# Patient Record
Sex: FEMALE | Race: WHITE | NOT HISPANIC OR LATINO | Employment: FULL TIME | URBAN - METROPOLITAN AREA
[De-identification: names, ages, dates, MRNs, and addresses within clinical notes are randomized per-mention and may not be internally consistent; named-entity substitution may affect disease eponyms.]

---

## 2017-02-17 ENCOUNTER — TELEPHONE (OUTPATIENT)
Dept: FAMILY MEDICINE | Facility: CLINIC | Age: 32
End: 2017-02-17

## 2017-02-20 ENCOUNTER — TELEPHONE (OUTPATIENT)
Dept: FAMILY MEDICINE | Facility: CLINIC | Age: 32
End: 2017-02-20

## 2017-02-20 NOTE — TELEPHONE ENCOUNTER
Attempted to reach pt again no answer  Encouraged in message that she call our number day or night if having symptoms or any concerns  lbetz

## 2017-02-23 ENCOUNTER — TELEPHONE (OUTPATIENT)
Dept: FAMILY MEDICINE | Facility: CLINIC | Age: 32
End: 2017-02-23

## 2017-02-23 NOTE — TELEPHONE ENCOUNTER
ED follow up    Spoke with pt today and she is feeling much better.  Said it was about two weeks of enduring the flu but now is feeling better.    She was unaware of our 24 hr line, so we talked about that, and encouraged her to call if she had any further symptoms or concerns, or to call if she wanted to get in.    Nora

## 2017-04-20 ENCOUNTER — OFFICE VISIT (OUTPATIENT)
Dept: FAMILY MEDICINE | Facility: CLINIC | Age: 32
End: 2017-04-20

## 2017-04-20 VITALS
TEMPERATURE: 97.9 F | HEIGHT: 61 IN | HEART RATE: 107 BPM | WEIGHT: 150.8 LBS | OXYGEN SATURATION: 97 % | SYSTOLIC BLOOD PRESSURE: 130 MMHG | DIASTOLIC BLOOD PRESSURE: 85 MMHG | BODY MASS INDEX: 28.47 KG/M2

## 2017-04-20 DIAGNOSIS — R63.4 WEIGHT LOSS: Primary | ICD-10-CM

## 2017-04-20 DIAGNOSIS — F41.9 ANXIETY: ICD-10-CM

## 2017-04-20 RX ORDER — HYDROXYZINE HYDROCHLORIDE 25 MG/1
25-50 TABLET, FILM COATED ORAL
Qty: 30 TABLET | Refills: 3 | Status: SHIPPED | OUTPATIENT
Start: 2017-04-20 | End: 2018-08-13

## 2017-04-20 NOTE — MR AVS SNAPSHOT
After Visit Summary   4/20/2017    Mrs. Venice Gaffney    MRN: [id number]           Patient Information     Date Of Birth          1985        Visit Information        Provider Department      4/20/2017 9:40 AM Jesus Cruz MD [facility]        Today's Diagnoses     Weight loss    -  1    Anxiety          Care Instructions    - schedule pap smear in the near future.    Your medication list is printed, please keep this with you, it is helpful to bring this current list to any other medical appointments, the emergency room or hospital.    If you had lab testing today and your results are reassuring or normal they will be be mailed to you within 7 days.     If the lab tests need quick action we will call you with the results.   The phone number we will call with results is # [phone] (home) . If this is not the best number please call our clinic and change the number.    If you need any refills please call your pharmacy and they will contact us.    If you have any further concerns or wish to schedule another appointment you must call our office during normal business hours  [phone] (8-5:00 M-F)  If you have urgent medical questions that cannot wait  you may also call [phone] at any time of day.  If you have a medical emergency please call [phone].    Thank you for coming to [facility].          Follow-ups after your visit        Who to contact     Please call your clinic at [phone] to:    Ask questions about your health    Make or cancel appointments    Discuss your medicines    Learn about your test results    Speak to your doctor   If you have compliments or concerns about an experience at your clinic, or if you wish to file a complaint, please contact [facility] Patient Relations at [phone] or email us at [email or web address]         Additional Information About Your Visit        MyChart Information     MyChart  "gives you secure access to your electronic health record. If you see a primary care provider, you can also send messages to your care team and make appointments. If you have questions, please call your primary care clinic.  If you do not have a primary care provider, please call [phone] and they will assist you.      [employer] is an electronic gateway that provides easy, online access to your medical records. With [employer], you can request a clinic appointment, read your test results, renew a prescription or communicate with your care team.     To access your existing account, please contact your [facility] or call [phone] for assistance.        Care EveryWhere ID     This is your Care EveryWhere ID. This could be used by other organizations to access your [place] medical records  [id number]        Your Vitals Were     Pulse Temperature Height Last Period Pulse Oximetry BMI (Body Mass Index)    107 97.9  F (36.6  C) (Oral) 5' 1\" (154.9 cm) 04/10/2017 97% 28.49 kg/m2       Blood Pressure from Last 3 Encounters:   04/20/17 130/85   01/26/16 129/84   12/09/15 130/82    Weight from Last 3 Encounters:   04/20/17 150 lb 12.8 oz (68.4 kg)   01/26/16 169 lb 8 oz (76.9 kg)   12/09/15 170 lb (77.1 kg)              Today, you had the following     No orders found for display         Today's Medication Changes          These changes are accurate as of: 4/20/17 10:01 AM.  If you have any questions, ask your nurse or doctor.               Stop taking these medicines if you haven't already. Please contact your care team if you have questions.     busPIRone 15 MG tablet   Commonly known as:  BUSPAR   Stopped by:  Jesus Cruz MD                Where to get your medicines      These medications were sent to [id number] - [place], [place] - [address] [facility] [address]  [facility] [address], [place] [address]     Phone:  [phone]     " hydrOXYzine 25 MG tablet                Primary Care Provider Office Phone # Fax #    Jesus Cruz [phone]       [facility] [address] [place] [address]        Thank you!     Thank you for choosing [facility]  for your care. Our goal is always to provide you with excellent care. Hearing back from our patients is one way we can continue to improve our services. Please take a few minutes to complete the written survey that you may receive in the mail after your visit with us. Thank you!             Your Updated Medication List - Protect others around you: Learn how to safely use, store and throw away your medicines at www.disposemymeds.org.          This list is accurate as of: 4/20/17 10:01 AM.  Always use your most recent med list.                   Brand Name Dispense Instructions for use    hydrOXYzine 25 MG tablet    ATARAX    30 tablet    Take 1-2 tablets (25-50 mg) by mouth nightly as needed for anxiety or other (sleep)

## 2017-04-20 NOTE — PROGRESS NOTES
SUBJECTIVE:  This is a 31-year-old female with a past history of generalized anxiety and obesity.  I last saw her about a year and a half ago.  She changed jobs to a less stressful environment and discontinue the BuSpar.  She feels her anxiety has been under good control.  She has only very occasional days with anxiety.  In the past she used hydroxyzine on a rare occasional basis which helps with her anxiety.  She is interested in going back to using hydroxyzine for anxiety symptoms.  She predicts once a week or less.  In the past she did not have difficulty with sedation, tiredness or other side effects with this medication.   Next, as far as her obesity she has started a diet that is a lower carb and higher protein.  She has lost 20 pounds over the past 2-1/2 months.  She is happy with the weight loss.  She is not using any supplements or stimulants.  She is using an exercise ball to sit on at work.  Otherwise no changes to her exercise regimen.   REVIEW OF SYSTEMS:  No fevers or chills.  No recent illnesses.  No respiratory symptoms.  No heat or cold intolerance.  No skin, hair or nail changes, no diarrhea or constipation.  No abdominal symptoms.  No vaginal bleeding outside her menstrual periods.  Her last menstrual period was 10 days ago and was normal and no stool changes.   OBJECTIVE:   VITAL SIGNS:  As above.   GENERAL:  She is alert, no distress.   HEENT:  Head is normocephalic and atraumatic.  Eyes, sclerae are nonicteric, noninjected.  Moist mucous membranes.   NECK:  Supple without lymphadenopathy.  Thyroid size is normal.   CARDIOVASCULAR:  Regular rate and rhythm without murmur.   LUNGS:  Clear to auscultation bilaterally.   ABDOMEN:  Modestly obese, soft and nontender throughout.   EXTREMITIES:  Free of edema.   ASSESSMENT AND PLAN:   1.  Intermittent anxiety symptoms is She at this point is doing well without a controller daily medication.  She may use hydroxyzine 25 mg 1-2 tablets on days she has  breakthrough anxiety.  I anticipate 30 tablets will likely last her 3 months or more.  She will return if she has increase in her anxiety side effects or other new concerns.   2.  Obesity:  She has had successful weight loss with her new dietary changes.  She plans to continue.  I encouraged her to incorporate some regular exercise in her plan.  She will return if she develops any symptoms of hyperthyroidism or other medical conditions that we discussed today.   3.  Health care maintenance:  Her last Pap smear was in 2010, I reviewed the [facility] records which shows an old Pap smear in 2010.  She is not sure if she has had any since that time.  The Pap smear in 2010 was normal.  I offered a Pap smear today.  She declined but assures me she will make an appointment sometime in the next couple of months for cervical cancer screening.

## 2017-04-20 NOTE — PATIENT INSTRUCTIONS
- schedule pap smear in the near future.    Your medication list is printed, please keep this with you, it is helpful to bring this current list to any other medical appointments, the emergency room or hospital.    If you had lab testing today and your results are reassuring or normal they will be be mailed to you within 7 days.     If the lab tests need quick action we will call you with the results.   The phone number we will call with results is # [phone] (home) . If this is not the best number please call our clinic and change the number.    If you need any refills please call your pharmacy and they will contact us.    If you have any further concerns or wish to schedule another appointment you must call our office during normal business hours  [phone] (8-5:00 M-F)  If you have urgent medical questions that cannot wait  you may also call [phone] at any time of day.  If you have a medical emergency please call [phone].    Thank you for coming to [facility].

## 2017-12-24 ENCOUNTER — HEALTH MAINTENANCE LETTER (OUTPATIENT)
Age: 32
End: 2017-12-24

## 2018-03-26 ENCOUNTER — TELEPHONE (OUTPATIENT)
Dept: FAMILY MEDICINE | Facility: CLINIC | Age: 33
End: 2018-03-26

## 2018-03-26 NOTE — TELEPHONE ENCOUNTER
Ed follow up    Left message encouraging a call back for follow up   Non urgent   Marked as non necessary for follow up but will attempt to reach pt again    lbetz

## 2018-03-27 ENCOUNTER — TELEPHONE (OUTPATIENT)
Dept: FAMILY MEDICINE | Facility: CLINIC | Age: 33
End: 2018-03-27

## 2018-03-28 ENCOUNTER — TELEPHONE (OUTPATIENT)
Dept: FAMILY MEDICINE | Facility: CLINIC | Age: 33
End: 2018-03-28

## 2018-03-28 NOTE — TELEPHONE ENCOUNTER
ED follow up    Urgency Room [facility] for ear infection  Antibiotic given    Unable to reach pt,  Left message to call if needed     Lbetz

## 2018-05-29 ENCOUNTER — TELEPHONE (OUTPATIENT)
Dept: FAMILY MEDICINE | Facility: CLINIC | Age: 33
End: 2018-05-29

## 2018-05-29 NOTE — TELEPHONE ENCOUNTER
ED follow up- [facility] urgency room    Pt seen for sore throat and Ear ache    Unable to reach left message

## 2018-05-30 ENCOUNTER — TELEPHONE (OUTPATIENT)
Dept: FAMILY MEDICINE | Facility: CLINIC | Age: 33
End: 2018-05-30

## 2018-05-30 NOTE — TELEPHONE ENCOUNTER
Was able to reach pt today, she is feeling better, still has a bit of a runny nose which then irritates her throat too, but overall better.  She made a recent trip to [place] and while there went to a [facility] clinic for pink eye and ear infection.  Both seem to be clearing with antibiotic she was given.  Has not current concerns and did not feel it was necessary to be seen at this time, so encouraged her to call if she needed to get in or to ask questions educated on 24 hr line

## 2018-08-13 ENCOUNTER — OFFICE VISIT (OUTPATIENT)
Dept: FAMILY MEDICINE | Facility: CLINIC | Age: 33
End: 2018-08-13
Payer: COMMERCIAL

## 2018-08-13 VITALS
SYSTOLIC BLOOD PRESSURE: 115 MMHG | HEART RATE: 80 BPM | RESPIRATION RATE: 20 BRPM | WEIGHT: 145 LBS | BODY MASS INDEX: 27.4 KG/M2 | TEMPERATURE: 98.3 F | OXYGEN SATURATION: 100 % | DIASTOLIC BLOOD PRESSURE: 77 MMHG

## 2018-08-13 DIAGNOSIS — R00.0 TACHYCARDIA: ICD-10-CM

## 2018-08-13 DIAGNOSIS — F41.1 GENERALIZED ANXIETY DISORDER: Primary | ICD-10-CM

## 2018-08-13 DIAGNOSIS — R07.89 ATYPICAL CHEST PAIN: ICD-10-CM

## 2018-08-13 LAB
ALBUMIN SERPL-MCNC: 4.1 G/DL (ref 3.3–4.6)
ALP SERPL-CCNC: 47 U/L (ref 40–150)
ALT SERPL-CCNC: 23 U/L (ref 0–45)
AST SERPL-CCNC: 29 U/L (ref 0–45)
BILIRUB SERPL-MCNC: 0.9 MG/DL (ref 0.2–1.3)
BUN SERPL-MCNC: 9 MG/DL (ref 5–24)
CALCIUM SERPL-MCNC: 9.6 MG/DL (ref 8.5–10.4)
CHLORIDE SERPLBLD-SCNC: 102 MMOL/L (ref 94–109)
CO2 SERPL-SCNC: 29 MMOL/L (ref 20–32)
CREAT SERPL-MCNC: 1 MG/DL (ref 0.6–1.3)
EGFR CALCULATED (BLACK REFERENCE): 82.1 ML/MIN
EGFR CALCULATED (NON BLACK REFERENCE): 67.9 ML/MIN
GLUCOSE SERPL-MCNC: 100 MG/DL (ref 60–109)
HCT VFR BLD AUTO: 45.1 % (ref 35–47)
HEMOGLOBIN: 14.5 G/DL (ref 11.7–15.7)
LIPASE SERPL-CCNC: 34 U/L (ref 0–52)
MCH RBC QN AUTO: 30.8 PG (ref 26.5–35)
MCHC RBC AUTO-ENTMCNC: 32.2 G/DL (ref 32–36)
MCV RBC AUTO: 95.7 FL (ref 78–100)
PLATELET # BLD AUTO: 258 K/UL (ref 150–450)
POTASSIUM SERPL-SCNC: 4.4 MMOL/L (ref 3.4–5.3)
PROT SERPL-MCNC: 7.1 G/DL (ref 6.6–8.8)
RBC # BLD AUTO: 4.71 M/UL (ref 3.8–5.2)
SODIUM SERPL-SCNC: 142 MMOL/L (ref 133–144)
TSH SERPL DL<=0.05 MIU/L-ACNC: 1.49 UIU/ML (ref 0.3–5)
WBC # BLD AUTO: 11.6 K/UL (ref 4–11)

## 2018-08-13 RX ORDER — HYDROXYZINE PAMOATE 25 MG/1
50 CAPSULE ORAL 2 TIMES DAILY PRN
Qty: 90 CAPSULE | Refills: 0 | Status: SHIPPED | OUTPATIENT
Start: 2018-08-13 | End: 2018-10-29

## 2018-08-13 ASSESSMENT — ANXIETY QUESTIONNAIRES
IF YOU CHECKED OFF ANY PROBLEMS ON THIS QUESTIONNAIRE, HOW DIFFICULT HAVE THESE PROBLEMS MADE IT FOR YOU TO DO YOUR WORK, TAKE CARE OF THINGS AT HOME, OR GET ALONG WITH OTHER PEOPLE: SOMEWHAT DIFFICULT
1. FEELING NERVOUS, ANXIOUS, OR ON EDGE: MORE THAN HALF THE DAYS
3. WORRYING TOO MUCH ABOUT DIFFERENT THINGS: MORE THAN HALF THE DAYS
5. BEING SO RESTLESS THAT IT IS HARD TO SIT STILL: MORE THAN HALF THE DAYS
6. BECOMING EASILY ANNOYED OR IRRITABLE: SEVERAL DAYS
2. NOT BEING ABLE TO STOP OR CONTROL WORRYING: MORE THAN HALF THE DAYS
GAD7 TOTAL SCORE: 13
7. FEELING AFRAID AS IF SOMETHING AWFUL MIGHT HAPPEN: MORE THAN HALF THE DAYS

## 2018-08-13 ASSESSMENT — PATIENT HEALTH QUESTIONNAIRE - PHQ9: 5. POOR APPETITE OR OVEREATING: MORE THAN HALF THE DAYS

## 2018-08-13 NOTE — NURSING NOTE
Chief Complaint   Patient presents with     Anxiety     follow up. Work stress load worsening. Meds not helping for the past one month.      Medication Reconciliation     completed.        /77  Pulse 80  Temp 98.3  F (36.8  C) (Oral)  Resp 20  Wt 145 lb (65.8 kg)  LMP 08/03/2018  SpO2 100%  BMI 27.4 kg/m2      Due for PAP

## 2018-08-13 NOTE — MR AVS SNAPSHOT
After Visit Summary   8/13/2018    Venice Gaffney    MRN: [id number]           Patient Information     Date Of Birth          1985        Visit Information        Provider Department      8/13/2018 3:20 PM Jesus Cruz MD [facility]        Today's Diagnoses     Generalized anxiety disorder    -  1    Atypical chest pain        Tachycardia          Care Instructions      Recommend reading or crocheting outside your bedroom    Recommend meeting with a counselor to help with your anxiety. Referral has been placed and someone from our clinic will reach out to you.    Start taking new medication  Hydroxyzine (Vistaril) 1-2 tablets up to twice dailyi. #90 given.       If no clear reason for your chest discomfort after lab results, and not better with rest and/or stretching in 2-3 weeks, return to clinic for chest xray.     Seek immediate care if increasing chest pain, heart raciing, shortness of breath, fever, cough or other new concerns.              Follow-ups after your visit        Additional Services     MENTAL HEALTH REFERRAL  -       Use this form for in clinic and community psychiatry and behavioral health consults. The referral coordinator will help to determine whether patients are best served by clinic behavioral health staff or by community providers.    Reason for referral: generalized anxiety exacerbated by recent job change    Please provide data for below screening tools if available.   PHQ-9 Score: 11   SANJUANA & Score: 13    Other Screening measures used:     Type of referral requested (indicate all that apply):    Adult Psychotherapy--for diagnosis and non-pharmacological treatment     needed: No  Language: English  ([facility] Only) Referral should be tracked (Yes/No)?                  Who to contact     Please call your clinic at [phone] to:    Ask questions about your health    Make or cancel appointments    Discuss your medicines    Learn about your test  results    Speak to your doctor            Additional Information About Your Visit        [employer] Information     [employer] gives you secure access to your electronic health record. If you see a primary care provider, you can also send messages to your care team and make appointments. If you have questions, please call your primary care clinic.  If you do not have a primary care provider, please call [phone] and they will assist you.      [employer] is an electronic gateway that provides easy, online access to your medical records. With [employer], you can request a clinic appointment, read your test results, renew a prescription or communicate with your care team.     To access your existing account, please contact your [facility] or call [phone] for assistance.        Care EveryWhere ID     This is your Care EveryWhere ID. This could be used by other organizations to access your [place] medical records  [id number]        Your Vitals Were     Pulse Temperature Respirations Last Period Pulse Oximetry BMI (Body Mass Index)    80 98.3  F (36.8  C) (Oral) 20 08/03/2018 100% 27.4 kg/m2       Blood Pressure from Last 3 Encounters:   08/13/18 115/77   04/20/17 130/85   01/26/16 129/84    Weight from Last 3 Encounters:   08/13/18 145 lb (65.8 kg)   04/20/17 150 lb 12.8 oz (68.4 kg)   01/26/16 169 lb 8 oz (76.9 kg)              We Performed the Following     CBC with Plt (P )     Comprehensive Metabolic Panel ([facility]) - results <1hr Protocol     EKG 12-lead complete w/read - Clinics     Lipase ([facility])     MENTAL HEALTH REFERRAL  -     Thyroid Tama ([facility])          Today's Medication Changes          These changes are accurate as of 8/13/18  8:06 PM.  If you have any questions, ask your nurse or doctor.               Start taking these medicines.        Dose/Directions    hydrOXYzine 25 MG capsule   Commonly known as:  VISTARIL   Used for:  Generalized anxiety disorder    Started by:  Jesus Cruz MD        Dose:  50 mg   Take 2 capsules (50 mg) by mouth 2 times daily as needed   Quantity:  90 capsule   Refills:  0         Stop taking these medicines if you haven't already. Please contact your care team if you have questions.     hydrOXYzine 25 MG tablet   Commonly known as:  ATARAX   Stopped by:  Jesus Cruz MD                Where to get your medicines      These medications were sent to [id number] [[place]], [place] - [address] [place] [address]  [address] [facility] [address]     Phone:  [phone]     hydrOXYzine 25 MG capsule                Primary Care Provider Office Phone # Fax #    Jesus Cruz [phone]       [facility] [address]        Equal Access to Services     CESAR HOYOS AH: Hadii tania giron hadasho Soomaali, waaxda luqadaha, qaybta kaalmada adeegyada, lindy skaggs . So [facility] [phone].    ATENCIÓN: Si habla español, tiene a ramey disposición servicios gratuitos de asistencia lingüística. Llame al [phone].    We comply with applicable federal civil rights laws and [place] laws. We do not discriminate on the basis of race, color, national origin, age, disability, sex, sexual orientation, or gender identity.            Thank you!     Thank you for choosing [facility]  for your care. Our goal is always to provide you with excellent care. Hearing back from our patients is one way we can continue to improve our services. Please take a few minutes to complete the written survey that you may receive in the mail after your visit with us. Thank you!             Your Updated Medication List - Protect others around you: Learn how to safely use, store and throw away your medicines at www.disposemymeds.org.          This list is accurate as of 8/13/18  8:06 PM.  Always use your most recent med list.                   Brand Name Dispense Instructions for use Diagnosis     hydrOXYzine 25 MG capsule    VISTARIL    90 capsule    Take 2 capsules (50 mg) by mouth 2 times daily as needed    Generalized anxiety disorder

## 2018-08-13 NOTE — PATIENT INSTRUCTIONS
Recommend reading or crocheting outside your bedroom    Recommend meeting with a counselor to help with your anxiety. Referral has been placed and someone from our clinic will reach out to you.    Start taking new medication  Hydroxyzine (Vistaril) 1-2 tablets up to twice dailyi. #90 given.       If no clear reason for your chest discomfort after lab results, and not better with rest and/or stretching in 2-3 weeks, return to clinic for chest xray.     Seek immediate care if increasing chest pain, heart raciing, shortness of breath, fever, cough or other new concerns.

## 2018-08-13 NOTE — PROGRESS NOTES
Your liver, kidney, and blood counts were normal.  As discussed, if your pain does not improve or gets any worse, return for re-evaluation including a chest x-ray

## 2018-08-14 ENCOUNTER — MYC MEDICAL ADVICE (OUTPATIENT)
Dept: FAMILY MEDICINE | Facility: CLINIC | Age: 33
End: 2018-08-14

## 2018-08-14 ASSESSMENT — PATIENT HEALTH QUESTIONNAIRE - PHQ9: SUM OF ALL RESPONSES TO PHQ QUESTIONS 1-9: 11

## 2018-08-14 ASSESSMENT — ANXIETY QUESTIONNAIRES: GAD7 TOTAL SCORE: 13

## 2018-08-14 NOTE — PROGRESS NOTES
"Subjective: This is a 33-year-old female with a past medical history significant for generalized anxiety disorder.  She has been using natural \"self-care\" including exercise, reading, hobbies and not used medication treatment in about a year.  She took a new job recently which has some stress, and this she feels has increased in her anxiety over the past month.  In addition she has had left chest \"soreness\"  over the past month.  The soreness is constant.  It covers an area from her left chest up into her left shoulder.  It is mild in severity.  It gets slightly worse when she pushes on the area otherwise no exacerbating or relieving factors.  It does not change with exertion.  2 weeks ago she took up kickboxing and the soreness does not change with this vigorous activity.  She feels like it is a pulled muscle.   No known injury.  The onset of symptoms preceded her new kickboxing regimen  No cough.  No palpitations but she has noticed that her heart monitor reads 180 bpm during her exertion during kickboxing and she is concerned that is too fast.  She does not have any new symptoms with a heart rate of 180.  No shortness of breath.  No dizziness.  She occasionally has bouts of lightheadedness which last about a minute once per day which have bothered her for more than a couple of years.  No history of syncope.  A SANJUANA scale is 13.  PHQ 9 is 11.  She has no thoughts of hurting herself or others.    Review of systems: She had lost about 10 pounds over the past year using a \"keto diet\".  She has gained back about 6 pounds over the past month.  No fevers or chills.  No vision changes.  Decreased energy.  Difficulty falling asleep.  Feeling nervous and unable to control her worrying more than half the days.  Worries about many different topics.  Difficulty relaxing more than half the days.  No respiratory symptoms, no cough, no shortness of breath.  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  She does smoke cigarettes and has been smoking more due to her anxiety.  She is now smoking close to a pack per day.  She drinks alcohol about once per week.    Family history: Her mother is 53 years old with hypertension, multiple sclerosis, diabetes.  She does not know her father's history of.  She has 2 sisters who are healthy.    Exam: Vitals are normal  General: She is alert somewhat anxious appearing and relates her own history with fluent speech  HEENT: Head is atraumatic.  Eyes sclera nonicteric noninjected.  Moist mucous membranes.  Tympanic membranes are white with normal bony light landmarks.  Neck is free of adenopathy.  Cardiovascular: Regular rate and rhythm without murmur.  Palpation of her left chest wall produces mild tenderness, with mild discomfort radiating into her left shoulder.  Respiratory: Lungs are clear to auscultation bilaterally  Abdomen: Soft and nontender.  No epigastric right upper quadrant tenderness.  Extremities: She has full range of motion of both upper extremities.  She has 5 out of 5 strength to left shoulder exam including the bicep tricep deltoid rotator cuff.    EKG: Normal sinus rhythm at 75 bpm.  No acute ST or T-wave changes.    Assessment and plan:  1) atypical chest pain: Most likely chest wall pain.  She described as similar chest soreness in March 2015 during a time of anxiety.  History of vigorous physical exercise without exacerbating symptoms is reassuring from a cardiovascular standpoint.  EKG shows no sign of arrhythmia or ischemia.  Her heart rate increased during vigorous exercise is likely normal, TSH is pending.  Hemoglobin is normal.  Differential diagnosis includes referred pain from a GI source conference of metabolic profile today was " reassuring.  A lipase is pending.  Recommended relative rest and avoiding pushing and pulling activities and avoiding kickboxing activities which could exacerbate.  With relative rest and improvement in her anxiety symptoms I expect improvement within 2 weeks.  If she has any worsening, develops new symptoms, or does not improve recommend reevaluation including a chest x-ray (she has a remote history of pneumonia and is a smoker).  2) recurrent generalized anxiety disorder: TSH was obtained today and is pending.  In the past she has tried Paxil, Effexor, BuSpar and does not want to return to a daily controller medication.  She has had fair symptomatic control with lifestyle modification and Atarax in the past on an as needed basis.  After discussion today of options including psychotherapy, serotonin specific reuptake inhibitor, SNRI, she elected to try an antihistamine (similar to atarax) as needed medication, Vistaril, 25-50 mg once to twice daily on an as-needed basis.  She will most likely use this in the evenings when she finds her anxious thoughts most difficult to control.  I also strongly recommended counseling therapy.  She admits she is somewhat resistant to the idea but will accept a referral and but will consider in the future.  She will follow-up regarding both her chest and anxiety in 2-3 weeks or earlier if any increase in symptoms

## 2018-08-15 ENCOUNTER — DOCUMENTATION ONLY (OUTPATIENT)
Dept: FAMILY MEDICINE | Facility: CLINIC | Age: 33
End: 2018-08-15

## 2018-08-15 NOTE — PROGRESS NOTES
Procedure Requested        9035     MENTAL HEALTH REFERRAL  -             [#[id number]]         Priority: Routine  Class: External referral         Comment:Use this form for in clinic and community psychiatry and behavioral                  health consults. The referral coordinator will help to determine                  whether patients are best served by clinic behavioral health staff                  or by community providers.                                    Reason for referral: generalized anxiety exacerbated by recent job                   change                                    Please provide data for below screening tools if available.                   PHQ-9 Score: 11                   SANJUANA & Score: 13                                    Other Screening measures used:                                     Type of referral requested (indicate all that apply):                                    Adult Psychotherapy--for diagnosis and non-pharmacological                   treatment                                     needed: No                  Language: English                  ([facility] Only) Referral should be tracked (Yes/No)?       Associated Diagnoses         F41.1 Generalized anxiety disorder         Adult or Child/Adolescent:  Adult               Location:  [facility]           [id number]               : 1985  F      [address] [facility]                                   PCP: [id number]       [facility] [address]                                 CTR: [facility]

## 2018-08-15 NOTE — TELEPHONE ENCOUNTER
I spoke with patient by phone.  She is happy with the plan.  She declined offer from our care coordinator today to set up counseling, she is hopeful her anxiety will improve soon.  She will reconsider should symptoms persist. She has no further questions.  She will follow up as outlined in my office note 8/13.   Colby

## 2018-08-15 NOTE — PROGRESS NOTES
Referral for (Test): Psychology   Location/Place/Provider: [facility]   Date/Time:    Phone: [phone]  Fax:   Additional information/prep.: Pt was not interested in this service right now, but will call if she decides that she needs it later.    Scheduled by: MARLENE Hartman

## 2018-09-26 ENCOUNTER — TELEPHONE (OUTPATIENT)
Dept: FAMILY MEDICINE | Facility: CLINIC | Age: 33
End: 2018-09-26

## 2018-09-26 NOTE — TELEPHONE ENCOUNTER
I got the pt ED discharge paperwork, I called to check up on the pt and help setup a ED follow up.  The pt was at the [facility] in [place] for ear pressure.  I talked to the pt, pt stated that she is doing better now, she had a ear infection, and took some medication and its fine.  Pt did not feel that she needs a follow up.

## 2018-10-29 ENCOUNTER — OFFICE VISIT (OUTPATIENT)
Dept: FAMILY MEDICINE | Facility: CLINIC | Age: 33
End: 2018-10-29
Payer: COMMERCIAL

## 2018-10-29 VITALS
SYSTOLIC BLOOD PRESSURE: 127 MMHG | OXYGEN SATURATION: 98 % | TEMPERATURE: 98.3 F | HEIGHT: 61 IN | HEART RATE: 91 BPM | DIASTOLIC BLOOD PRESSURE: 81 MMHG | WEIGHT: 144.4 LBS | RESPIRATION RATE: 18 BRPM | BODY MASS INDEX: 27.26 KG/M2

## 2018-10-29 DIAGNOSIS — M26.609 TMJ (TEMPOROMANDIBULAR JOINT SYNDROME): Primary | ICD-10-CM

## 2018-10-29 DIAGNOSIS — Z23 NEED FOR INFLUENZA VACCINATION: ICD-10-CM

## 2018-10-29 DIAGNOSIS — F41.1 GENERALIZED ANXIETY DISORDER: ICD-10-CM

## 2018-10-29 RX ORDER — HYDROXYZINE PAMOATE 25 MG/1
50 CAPSULE ORAL 2 TIMES DAILY PRN
Qty: 90 CAPSULE | Refills: 0 | Status: SHIPPED | OUTPATIENT
Start: 2018-10-29 | End: 2019-02-19

## 2018-10-29 NOTE — MR AVS SNAPSHOT
After Visit Summary   10/29/2018    Venice Gaffney    MRN: [id number]           Patient Information     Date Of Birth          1985        Visit Information        Provider Department      10/29/2018 11:00 AM Jesus Cruz MD [facility]        Today's Diagnoses     TMJ (temporomandibular joint syndrome)    -  1    Need for influenza vaccination        Generalized anxiety disorder          Care Instructions    You can use Vistaril for anxiety, particularly flight anxiety as we discussed in clinic    Please visit your dentist for evaluation of your temporomandibular joint and follow-up on your gum health          Follow-ups after your visit        Who to contact     Please call your clinic at [phone] to:    Ask questions about your health    Make or cancel appointments    Discuss your medicines    Learn about your test results    Speak to your doctor            Additional Information About Your Visit        MyChart Information     Rep gives you secure access to your electronic health record. If you see a primary care provider, you can also send messages to your care team and make appointments. If you have questions, please call your primary care clinic.  If you do not have a primary care provider, please call [phone] and they will assist you.      Rep is an electronic gateway that provides easy, online access to your medical records. With Rep, you can request a clinic appointment, read your test results, renew a prescription or communicate with your care team.     To access your existing account, please contact your [facility] or call [phone] for assistance.        Care EveryWhere ID     This is your Care EveryWhere ID. This could be used by other organizations to access your [facility] medical records  [id number]        Your Vitals Were     Pulse Temperature Respirations Height Last Period Pulse Oximetry    91 98.3  F (36.8  C)  "(Oral) 18 5' 0.83\" (154.5 cm) 10/24/2018 98%    BMI (Body Mass Index)                   27.44 kg/m2            Blood Pressure from Last 3 Encounters:   10/29/18 127/81   08/13/18 115/77   04/20/17 130/85    Weight from Last 3 Encounters:   10/29/18 144 lb 6.4 oz (65.5 kg)   08/13/18 145 lb (65.8 kg)   04/20/17 150 lb 12.8 oz (68.4 kg)              We Performed the Following     ADMIN VACCINE, INITIAL     FLU VAC PRESRV FREE QUAD SPLIT VIR IM, 0.5 mL dosage          Where to get your medicines      These medications were sent to [id number] [[place]][address] [address]     Phone:  [phone]     hydrOXYzine 25 MG capsule          Primary Care Provider Office Phone # Fax #    Jesus Cruz [phone]       [facility] [address]        Equal Access to Services     [facility]: Hadii aad ku hadasho Soomaali, waaxda luqadaha, qaybta kaalmada adeegyada, lindy skaggs . So [facility] [phone].    ATENCIÓN: Si habla español, tiene a ramey disposición servicios gratuitos de asistencia lingüística. Llame al [phone].    We comply with applicable federal civil rights laws and [place] laws. We do not discriminate on the basis of race, color, national origin, age, disability, sex, sexual orientation, or gender identity.            Thank you!     Thank you for choosing [facility]  for your care. Our goal is always to provide you with excellent care. Hearing back from our patients is one way we can continue to improve our services. Please take a few minutes to complete the written survey that you may receive in the mail after your visit with us. Thank you!             Your Updated Medication List - Protect others around you: Learn how to safely use, store and throw away your medicines at www.disposemymeds.org.          This list is accurate as of 10/29/18 11:43 AM.  Always use your " most recent med list.                   Brand Name Dispense Instructions for use Diagnosis    hydrOXYzine 25 MG capsule    VISTARIL    90 capsule    Take 2 capsules (50 mg) by mouth 2 times daily as needed    Generalized anxiety disorder

## 2018-10-29 NOTE — PROGRESS NOTES
Subjective: 33-year-old female who has had 1 month of bilateral jaw pain.  She had extensive dental work done in August 2018 for gingivitis.  She had a procedure done on 2 occasions where she had to have her mouth held open for 90 minutes per occasion.  She did not have discomfort until about 3 or 4 weeks later.  In mid to late September she developed an achy pain she points from her bilateral temporomandibular joints down her jawline to her chin.  The pain is aching in nature.  It is worse with talking or when she touches her teeth together.  There is no change with chewing.  She finds herself conscious of the position of her mouth at all times which is bothersome.  She sometimes senses that her jaw is popping when she opens it fully.  The pain bothers her each day.  It has not increased or decreased over the past month.  She tried a muscle relaxer pill on one occasion which seemed to help.    Review of systems: No fevers or chills.  No bleeding gums.  No tooth pain.  No drainage.  No change in her tongue or gums.  She had one migraine headache over the past month.  No ear pain or dizziness.  No change in her sleep.  Continues to white anxiety.    Exam: Vitals are normal with blood pressure at goal  General: She is alert and relates her own history  HEENT: She has mild tenderness to palpation at the temporomandibular joints bilaterally.  I do not feel any dislocation with mouth opening and closing of.  No tenderness along the remainder of the mandible.  No lymphadenopathy or tenderness over parotid or submental glands.  Tympanic membranes are white with normal bony and light landmarks.  Normal-appearing dentition.  No lesions of the oral mucosa.  No midline neck tenderness  Cardiovascular: Regular rate and rhythm without murmur  Respiratory: Lungs clear to auscultate bilaterally    Assessment and plan:  1) jaw pain: Differential diagnosis includes temporal mandibular joint dysfunction, strain of the muscles of  mastication, or less likely occult dental infection or nerve pain.  I have asked that she be evaluated by her dentist.  The jaw pain is exacerbating her anxiety, in the meantime she can use Vistaril 25-50 mg once daily as needed for anxiety    2) flying phobia: She has 3 flights coming up in the next year.  We discussed using Vistaril 75 mg 1 hour before the flight.  She will be traveling with a  on each flight who will assist.  She also has some self soothing and relaxation techniques.  She will not mix alcohol with Vistaril.

## 2018-10-29 NOTE — NURSING NOTE
Injectable influenza vaccine documentation    1. Has the patient received the information for the influenza vaccine? YES    2. Does the patient have a severe allergy to eggs (Patients with a severe egg allergy should be assessed by a medical provider, RN, or clinical pharmacist. If they receive the influenza vaccine, please have them observed for 15 minutes.)? No    3. Has the patient had an allergic reaction to previous influenza vaccines? No    4. Has the patient had any severe allergic reactions to past influenza vaccines ? No       5. Does patient have a history of Guillain-Basom syndrome? No      Based on responses above, I administered the influenza vaccine.  JUNIOR MARTELL, CMA

## 2018-10-29 NOTE — PATIENT INSTRUCTIONS
You can use Vistaril for anxiety, particularly flight anxiety as we discussed in clinic    Please visit your dentist for evaluation of your temporomandibular joint and follow-up on your gum health

## 2018-11-26 ENCOUNTER — TELEPHONE (OUTPATIENT)
Dept: FAMILY MEDICINE | Facility: CLINIC | Age: 33
End: 2018-11-26

## 2018-11-26 NOTE — TELEPHONE ENCOUNTER
ED visit to JACKI nick       Attempted to reach pt  Left message  Follow up to her rash  Encouraged a call to our 24 hr line if needed  lbetz

## 2018-12-13 ENCOUNTER — OFFICE VISIT (OUTPATIENT)
Dept: FAMILY MEDICINE | Facility: CLINIC | Age: 33
End: 2018-12-13

## 2018-12-13 VITALS
TEMPERATURE: 98.1 F | WEIGHT: 156 LBS | RESPIRATION RATE: 16 BRPM | DIASTOLIC BLOOD PRESSURE: 81 MMHG | BODY MASS INDEX: 29.45 KG/M2 | HEART RATE: 91 BPM | HEIGHT: 61 IN | OXYGEN SATURATION: 99 % | SYSTOLIC BLOOD PRESSURE: 124 MMHG

## 2018-12-13 DIAGNOSIS — R01.1 HEART MURMUR, SYSTOLIC: ICD-10-CM

## 2018-12-13 DIAGNOSIS — R35.0 URINARY FREQUENCY: Primary | ICD-10-CM

## 2018-12-13 LAB
BILIRUB UR QL STRIP: NEGATIVE
CLARITY, URINE: CLEAR
COLOR UR AUTO: NORMAL
GLUCOSE UR QL STRIP: NEGATIVE
HGB UR QL STRIP: NEGATIVE
KETONES UR QL STRIP: NEGATIVE
LEUKOCYTE ESTERASE UR QL STRIP: NEGATIVE
NITRITE UR QL STRIP: NEGATIVE
PH UR STRIP: 6 [PH] (ref 4.5–8)
PROT UR QL STRIP: NEGATIVE MG/DL
SP GR UR STRIP: 1.01 (ref 1–1.03)
UROBILINOGEN UR QL STRIP: NORMAL

## 2018-12-13 RX ORDER — SULFAMETHOXAZOLE/TRIMETHOPRIM 800-160 MG
1 TABLET ORAL 2 TIMES DAILY
Qty: 6 TABLET | Refills: 0 | Status: SHIPPED | OUTPATIENT
Start: 2018-12-13 | End: 2019-06-10

## 2018-12-13 ASSESSMENT — MIFFLIN-ST. JEOR: SCORE: 1345.37

## 2018-12-13 NOTE — PROGRESS NOTES
"       Subjective:   Venice Gaffney is a 33 year old year old female who presents to clinic today for the following health issues:    Urinary frequency:  3 days duration  No dysuria  Low back pain for same duration  No N/V/Fevers    Heart murmur:  Noted on exam  No SOB, syncope  Occasional \"sore\" left chest into shoulder, thought it was 2/2 anxiety         PMHX:   PAST MEDICAL HISTORY:  Patient Active Problem List   Diagnosis     Migraines     CURRENT MEDICATIONS:  Current Outpatient Medications   Medication Sig Dispense Refill     hydrOXYzine (VISTARIL) 25 MG capsule Take 2 capsules (50 mg) by mouth 2 times daily as needed 90 capsule 0     sulfamethoxazole-trimethoprim (BACTRIM DS/SEPTRA DS) 800-160 MG tablet Take 1 tablet by mouth 2 times daily for 3 days 6 tablet 0     ALLERGIES:     Allergies   Allergen Reactions     Cat Hair Extract      Other reaction(s): Sneezing     Demerol [Meperidine]      Passing out     Imitrex [Sumatriptan]      Fish Allergy Rash          Objective:     Vitals:    12/13/18 1614   BP: 124/81   Pulse: 91   Resp: 16   Temp: 98.1  F (36.7  C)   TempSrc: Oral   SpO2: 99%   Weight: 70.8 kg (156 lb)   Height: 1.542 m (5' 0.71\")     Body mass index is 29.76 kg/m .  No results found for this or any previous visit (from the past 24 hour(s)).    General: Alert, well-appearing female in NAD  Pulm: CTA BL, no tachypnea  CV: RRR, 2/6 systolic murmur  Abd: soft, no masses, minimally tender suprapubic area  Back: minimal low back tenderness  Psych: Mood appropriate to visit content, full affect, rational thought content and process    Assessment and Plan:   1. Urinary frequency  Frequency with reported CVA tenderness, though afebrile, non-toxic appearing and relatively benign exam. Will treat bactrim given symptoms, await UA send out results.   - Urinalysis-UC If Indicate ([facility])  - sulfamethoxazole-trimethoprim (BACTRIM DS/SEPTRA DS) 800-160 MG tablet; Take 1 tablet by mouth 2 times daily for 3 " days  Dispense: 6 tablet; Refill: 0    2. Heart murmur, systolic  Mentioned in the past, per patient but never worked up. Echo to assess valve function.   - Echocardiogram Complete; Future    Will call to discuss return for CPE.     Options for treatment and follow-up care were reviewed with the patient and/or guardian. Venice Gaffney and/or guardian engaged in the decision making process and verbalized understanding of the options discussed and agreed with the final plan.    Concepción Gaspar DO  [facility] Family Medicine Resident    Precepted with: Emilia Jay DO

## 2018-12-14 ENCOUNTER — RECORDS - HEALTHEAST (OUTPATIENT)
Dept: ADMINISTRATIVE | Facility: OTHER | Age: 33
End: 2018-12-14

## 2018-12-14 NOTE — PATIENT INSTRUCTIONS
Referral for ( TEST )  :      Echocardiogram  LOCATION/PLACE/Provider :    [facility]  DATE & TIME :     12- at 2:45  PHONE :     [phone]  FAX :     [phone]  Appointment made by clinic staff/:    Ambar

## 2018-12-14 NOTE — RESULT ENCOUNTER NOTE
Please notify patient that her urine did not appear to be infected. She can safely stop the antibiotic prescribed.    Concepción Gaspar DO  [facility] Medicine Resident  Pager #[phone]

## 2018-12-18 NOTE — PROGRESS NOTES
Preceptor Attestation:   Patient seen, evaluated and discussed with the resident. I have verified the content of the note, which accurately reflects my assessment of the patient and the plan of care.  Supervising Physician:Emilia Jay DO [facility]

## 2018-12-21 ENCOUNTER — COMMUNICATION - HEALTHEAST (OUTPATIENT)
Dept: TELEHEALTH | Facility: CLINIC | Age: 33
End: 2018-12-21

## 2018-12-21 ENCOUNTER — HOSPITAL ENCOUNTER (OUTPATIENT)
Dept: CARDIOLOGY | Facility: HOSPITAL | Age: 33
Discharge: HOME | End: 2018-12-21
Attending: FAMILY MEDICINE

## 2018-12-21 DIAGNOSIS — R01.1 HEART MURMUR: ICD-10-CM

## 2018-12-21 LAB
AORTIC ROOT: 2.5 CM
BSA FOR ECHO PROCEDURE: 1.74 M2
CV BLOOD PRESSURE: ABNORMAL MMHG
CV ECHO HEIGHT: 61 IN
CV ECHO WEIGHT: 155 LBS
DOP CALC LVOT AREA: 2.54 CM2
DOP CALC LVOT DIAMETER: 1.8 CM
DOP CALC LVOT STROKE VOLUME: 68.2 CM3
DOP CALCLVOT PEAK VEL VTI: 26.8 CM
EJECTION FRACTION: 74 % (ref 55–75)
FRACTIONAL SHORTENING: 36.4 % (ref 28–44)
INTERVENTRICULAR SEPTUM IN END DIASTOLE: 0.9 CM (ref 0.6–0.9)
IVS/PW RATIO: 0.9
LA AREA 1: 13.5 CM2
LA AREA 2: 13.1 CM2
LEFT ATRIUM LENGTH: 4.62 CM
LEFT ATRIUM SIZE: 3 CM
LEFT ATRIUM TO AORTIC ROOT RATIO: 1.2 NO UNITS
LEFT ATRIUM VOLUME INDEX: 18.7 ML/M2
LEFT ATRIUM VOLUME: 32.5 ML
LEFT VENTRICLE CARDIAC INDEX: 3.8 L/MIN/M2
LEFT VENTRICLE CARDIAC OUTPUT: 6.6 L/MIN
LEFT VENTRICLE DIASTOLIC VOLUME INDEX: 42.5 CM3/M2 (ref 29–61)
LEFT VENTRICLE DIASTOLIC VOLUME: 74 CM3 (ref 46–106)
LEFT VENTRICLE HEART RATE: 97 BPM
LEFT VENTRICLE MASS INDEX: 50.4 G/M2
LEFT VENTRICLE SYSTOLIC VOLUME INDEX: 10.9 CM3/M2 (ref 8–24)
LEFT VENTRICLE SYSTOLIC VOLUME: 19 CM3 (ref 14–42)
LEFT VENTRICULAR INTERNAL DIMENSION IN DIASTOLE: 3.3 CM (ref 3.8–5.2)
LEFT VENTRICULAR INTERNAL DIMENSION IN SYSTOLE: 2.1 CM (ref 2.2–3.5)
LEFT VENTRICULAR MASS: 87.7 G
LEFT VENTRICULAR OUTFLOW TRACT MEAN GRADIENT: 4 MMHG
LEFT VENTRICULAR OUTFLOW TRACT MEAN VELOCITY: 97.9 CM/S
LEFT VENTRICULAR POSTERIOR WALL IN END DIASTOLE: 1 CM (ref 0.6–0.9)
LV STROKE VOLUME INDEX: 39.2 ML/M2
MITRAL VALVE E/A RATIO: 1.2
MV AVERAGE E/E' RATIO: 7.6 CM/S
MV DECELERATION TIME: 151 MS
MV E'TISSUE VEL-LAT: 15 CM/S
MV E'TISSUE VEL-MED: 9.07 CM/S
MV LATERAL E/E' RATIO: 6.1
MV MEDIAL E/E' RATIO: 10.1
MV PEAK A VELOCITY: 79.5 CM/S
MV PEAK E VELOCITY: 91.8 CM/S
NUC REST DIASTOLIC VOLUME INDEX: 2480 LBS
NUC REST SYSTOLIC VOLUME INDEX: 61 IN
PR MAX PG: 3 MMHG
PR PEAK VELOCITY: 92.3 CM/S
TRICUSPID VALVE ANULAR PLANE SYSTOLIC EXCURSION: 2.1 CM

## 2018-12-21 ASSESSMENT — MIFFLIN-ST. JEOR: SCORE: 1330.46

## 2019-02-19 ENCOUNTER — MYC REFILL (OUTPATIENT)
Dept: OTHER | Age: 34
End: 2019-02-19

## 2019-02-19 ENCOUNTER — TELEPHONE (OUTPATIENT)
Dept: FAMILY MEDICINE | Facility: CLINIC | Age: 34
End: 2019-02-19

## 2019-02-19 DIAGNOSIS — F41.1 GENERALIZED ANXIETY DISORDER: ICD-10-CM

## 2019-02-19 NOTE — TELEPHONE ENCOUNTER
I called Venice Gaffney to schedule an appointment for refill of Hydroxyzine. Unable to reach pt, Left message for pt to call back and schedule an appointment with Jesus Cruz, [facility] [address]

## 2019-02-19 NOTE — TELEPHONE ENCOUNTER
Patient called stating that someone called her and stated that she needed to come in since she hasn't seen Dr Cruz in a year. States that she was just her a little over a month ago with Dr. Gaspar. I notified her I could put in a message and see what the doctor says.

## 2019-02-19 NOTE — PROGRESS NOTES
Venice Gaffney was last seen on 12/13/2019 by .  Has been seen within the past year and would like a refill for Hydroxyzine.      Tracy España, CMA

## 2019-02-20 ENCOUNTER — TELEPHONE (OUTPATIENT)
Dept: FAMILY MEDICINE | Facility: CLINIC | Age: 34
End: 2019-02-20

## 2019-02-20 RX ORDER — HYDROXYZINE PAMOATE 25 MG/1
50 CAPSULE ORAL 2 TIMES DAILY PRN
Qty: 90 CAPSULE | Refills: 1 | Status: SHIPPED | OUTPATIENT
Start: 2019-02-20 | End: 2019-06-10 | Stop reason: ALTCHOICE

## 2019-02-20 NOTE — TELEPHONE ENCOUNTER
Called in prescription to pharmacy. They did not have the 25 mg capsules or tablets. The pharmacist stated they are not able to order any right now as they are completely out of stock. They are filling 50 mg capsules for pt to take twice daily as needed for anxiety.

## 2019-02-20 NOTE — TELEPHONE ENCOUNTER
Ed follow up- JACKI Cardozo    Attempted to reach pt but number sounds like a busy signal    Attempted twice will try again later to see if busy or phone issue    Lbetz

## 2019-02-20 NOTE — TELEPHONE ENCOUNTER
Hydroxyzine refill approved.  Rx set to print due to EMR issue, [facility] staff notified and called in Rx for Hydroxyzine to pharmacy.

## 2019-02-21 ENCOUNTER — TELEPHONE (OUTPATIENT)
Dept: FAMILY MEDICINE | Facility: CLINIC | Age: 34
End: 2019-02-21

## 2019-02-21 NOTE — TELEPHONE ENCOUNTER
ED follow up JACKI Cardozo    Had to leave another message- pt not answering  Encouraged a call back  lbetz

## 2019-06-10 ENCOUNTER — OFFICE VISIT (OUTPATIENT)
Dept: FAMILY MEDICINE | Facility: CLINIC | Age: 34
End: 2019-06-10
Payer: COMMERCIAL

## 2019-06-10 VITALS
TEMPERATURE: 98.5 F | OXYGEN SATURATION: 98 % | HEART RATE: 96 BPM | BODY MASS INDEX: 29.64 KG/M2 | HEIGHT: 61 IN | DIASTOLIC BLOOD PRESSURE: 84 MMHG | SYSTOLIC BLOOD PRESSURE: 127 MMHG | WEIGHT: 157 LBS | RESPIRATION RATE: 22 BRPM

## 2019-06-10 DIAGNOSIS — F40.243 ANXIETY WITH FLYING: Primary | ICD-10-CM

## 2019-06-10 DIAGNOSIS — J30.2 SEASONAL ALLERGIC RHINITIS, UNSPECIFIED TRIGGER: ICD-10-CM

## 2019-06-10 DIAGNOSIS — Z30.09 GENERAL COUNSELING AND ADVICE FOR CONTRACEPTIVE MANAGEMENT: ICD-10-CM

## 2019-06-10 RX ORDER — CLONAZEPAM 1 MG/1
1 TABLET ORAL DAILY PRN
Qty: 10 TABLET | Refills: 0 | Status: SHIPPED | OUTPATIENT
Start: 2019-06-10 | End: 2019-09-09

## 2019-06-10 RX ORDER — LORATADINE 10 MG/1
10 TABLET ORAL DAILY
COMMUNITY
Start: 2019-06-10 | End: 2021-11-15

## 2019-06-10 ASSESSMENT — MIFFLIN-ST. JEOR: SCORE: 1349.91

## 2019-06-10 NOTE — NURSING NOTE
"Chief Complaint   Patient presents with     RECHECK     anxiety and meds f/u. Meds from last visit not helping much. Going on a trip to [place] in about 2 weeks for 3.5 weeks.      Contraception     interested in getting back on the Nexplanon.      Medication Reconciliation     completed. Also currently taking OTC Loratidine daily.        /84   Pulse 96   Temp 98.5  F (36.9  C) (Oral)   Resp 22   Ht 1.55 m (5' 1.02\")   Wt 71.2 kg (157 lb)   SpO2 98%   BMI 29.64 kg/m        "

## 2019-06-10 NOTE — PROGRESS NOTES
Subjective  34-year-old female with a past history of anxiety spanning many years.  She feels her day-to-day anxiety is under good control.  Her work is going well.  She at times has anxiety which she controls with relaxation techniques.  She is not using any medications for anxiety.  She has tried daily medications for anxiety in the past such as SSRIs and did not find them effective and had bothersome side effects.      She has had a flight anxiety in the past.  She has tried Vistaril 50 to 100 mg in the recent past for flight anxiety which is not been effective.      She has a trip to [place] planned for 2 weeks from now which will last to 3 weeks.  She is very excited about the trip but is worried about anxiety she will have on days she is flying.  She has 6 or 7 different flights for her trip to [place].  She has had a panic attack in the past and had a emergency department visit for anxiety.  She has had Xanax in the emergency department which was not effective for her anxiety.    She has worked to find a different natural anxiety relieving techniques over the years.  She is a previous DBT counselor.  She uses crocheting and music as relaxation techniques.    She is interested in using an implantable Nexplanon device for contraception.  She has used the Implanon in the remote past without side effects.  She likes the convenience and long duration of the implantable device, while still being reversible.      Review of systems:  PHQ 9 survey has a total score of 4 with some occasional difficulty with concentration, tiredness and occasionally feeling down    SANJUANA-7 score is 5 mainly with anxious symptoms around thinking about her upcoming flights.    No significant weight change.  Recent runny nose and itchy eyes which have been relieved with Claritin.  She is not using any decongestants.  She has a history of seasonal allergies.  She at times feels more warm than others, but no significant heat  intolerance.  No skin hair or nail changes  No chest pain or shortness of breath  No bowel changes    Social history  She is  and will be traveling with her  and her mother-in-law.  She smokes about 1/2 pack/day and also uses e-cigarettes  She does not use other drugs.  Drinks occasional alcohol.    Medications, Claritin 10 mg daily    Exam  Vitals are reviewed and normal  General: Alert and in no distress  HEENT: Eyes sclera nonicteric noninjected.  Extraocular movements are intact.  Moist because membranes without tonsillar hypertrophy or exudate  Neck is free of adenopathy.  Thyroid is normal size without nodules next line Cardia vascular: Regular rate and rhythm without murmur  Respiratory: Lungs clear bilaterally  Abdomen is obese and nontender    Assessment and plan  1) anxiety with flying: We discussed various options including using more meditative and self relaxation techniques, retrying antihistamine, retrying SSRI daily medication, versus short-term benzodiazepine treatment.  As she has tried the other medications without success she elected to use Klonopin 1 mg prior to her flights.  We discussed the sedating, and potentially addictive nature of the medication, do not mix with alcohol, be cautious with decision-making.  She will be in constant traveling contact with her  and mother-in-law.    I reviewed the prescribers database and the patient does not have history of controlled substance prescriptions on file.    Clonazepam 1 mg tablets, use 1 tablet on flight days as needed, number 10 tablets prescribed.    We also discussed weaning her caffeine use, and continuing her self relaxation techniques such as crocheting and listening to music    2) seasonal allergy: Continue loratadine 10 mg daily, avoid decongestants which could exacerbate anxiety    3) contraceptive counseling: We discussed various options the patient would like to pursue Nexplanon.  She will schedule an appointment  for this procedure.

## 2019-06-10 NOTE — PATIENT INSTRUCTIONS
For your flight anxiety take Clonazepam 1mg 1-2 hours before your flight  Avoid alcohol and clonazepam  Clonazepam can be sedating, do not drive on this medication  Slowly reduce your caffeine intake  Continue your crocheting and music to reduce anxiety      Call for a procedure appointment to have the NEXPLANON contraceptive device placed

## 2019-07-30 ENCOUNTER — OFFICE VISIT (OUTPATIENT)
Dept: FAMILY MEDICINE | Facility: CLINIC | Age: 34
End: 2019-07-30
Payer: COMMERCIAL

## 2019-07-30 VITALS
HEART RATE: 109 BPM | SYSTOLIC BLOOD PRESSURE: 114 MMHG | DIASTOLIC BLOOD PRESSURE: 85 MMHG | OXYGEN SATURATION: 100 % | RESPIRATION RATE: 18 BRPM | TEMPERATURE: 98.2 F | WEIGHT: 155.6 LBS | BODY MASS INDEX: 29.38 KG/M2 | HEIGHT: 61 IN

## 2019-07-30 DIAGNOSIS — Z30.017 INSERTION OF IMPLANTABLE SUBDERMAL CONTRACEPTIVE: ICD-10-CM

## 2019-07-30 LAB — HCG UR QL: NEGATIVE

## 2019-07-30 ASSESSMENT — MIFFLIN-ST. JEOR: SCORE: 1343.18

## 2019-07-30 NOTE — PROGRESS NOTES
Procedure Note-Nexplanon Insertion    Venice Gaffney is a patient of Jesus Shirley here for placement of etonogestrel implant (Nexplanon)    Indication:Contraception    Consent: Affirmation of informed consent was signed and scanned into the medical record. Risks, benefits and alternatives were discussed. Patient's questions were elicited and answered.   Procedure safety checklist was completed:  Yes  Time Out (Pause for the Cause) completed: Yes    Labs: UPT:  Negative  LMP:   No LMP recorded.     1 previous child, does not intend to have more  Previous Contraception:  Nexplanon  Counseling:  Pt. counseled on potential side effects of  Nexplanon, including unpredictable spotting/bleeding and plan for removal/replacement of Nexplanon in 3 years or less.    Preoperative Diagnosis: Desires effective contraception  Postoperative Diagnosis: etonogestrel implant in place  Skin prep Betadine  Anesthesia 1% lidocaine, with epi, 3 ml    Technique:   Patient was placed supine with right arm exposed.  Con was made 8-10cm above medial epicondial and a guiding con 4 cm above the first.  Arm was prepped with betadine.Insertion point was anesthetized with 1% lidocaine 2mL. After stretching the skin with thumb and index finger around the insertion site.  Skin punctured with the tip of the needle inserted at 30 degrees and then lowered to horizontal position. While lifting the skin with the tip of the needle, slided the needle to it's full length. Applicator was then stabilized and the purple slider was unlocked by pushing it slightly down. Slider moved back completely until it stopped. Applicator was then removed.  Correct placement of the implant was confirmed by palpation in the patient's arm and visualizing the purple top of the obturator.  Insertion site was dressed with an adhesive covered by a pressure dressing.      User card and patient chart label filled out. User card  given to patient for record. Nexplanon  added to medication list     Lot# [ [id number] ]    EBL: minimal  Complications:  No  Tolerance:  Pt tolerated procedure well and was in stable condition.     Follow up: Pt was instructed to call if bleeding, severe pain or foul smell.     Follow up in 2-4 weeks PRN  Resident: Anoop Birmingham  Faculty: Dr. Gonzalez present for and supervised this entire procedure.

## 2019-07-30 NOTE — PROGRESS NOTES
Preceptor Attestation:  Patient's case reviewed and discussed with  Patient seen and discussed with the resident..  I agree with written assessment and plan of care.    I was present for entirety of the uncomplicated nexplanon insertion performed by Dr Merlos.      Supervising Physician:  Gabriella Gonzalez MD  [facility]

## 2019-09-09 ENCOUNTER — MYC REFILL (OUTPATIENT)
Dept: OTHER | Age: 34
End: 2019-09-09

## 2019-09-09 ENCOUNTER — TELEPHONE (OUTPATIENT)
Dept: FAMILY MEDICINE | Facility: CLINIC | Age: 34
End: 2019-09-09

## 2019-09-09 DIAGNOSIS — F40.243 ANXIETY WITH FLYING: ICD-10-CM

## 2019-09-09 DIAGNOSIS — F41.1 GENERALIZED ANXIETY DISORDER: Primary | ICD-10-CM

## 2019-09-09 RX ORDER — HYDROXYZINE PAMOATE 25 MG/1
50 CAPSULE ORAL EVERY 12 HOURS PRN
Qty: 60 CAPSULE | Refills: 1 | Status: SHIPPED | OUTPATIENT
Start: 2019-09-09 | End: 2019-09-09

## 2019-09-09 RX ORDER — HYDROXYZINE PAMOATE 25 MG/1
50 CAPSULE ORAL EVERY 12 HOURS PRN
Qty: 60 CAPSULE | Refills: 1 | Status: SHIPPED | OUTPATIENT
Start: 2019-09-09 | End: 2019-12-01

## 2019-09-09 RX ORDER — CLONAZEPAM 1 MG/1
1 TABLET ORAL DAILY PRN
Qty: 10 TABLET | Refills: 0 | OUTPATIENT
Start: 2019-09-09

## 2019-09-09 NOTE — TELEPHONE ENCOUNTER
Called patient and advised hydroxyzine was called into pharmacy. Patient verbalized understanding. Rj TINEO

## 2019-09-09 NOTE — TELEPHONE ENCOUNTER
Refill will be called for hydroxizine 25 to 50mg up to every 12 hours as needed for situational anxiety.  Disp: #60  Refills 1    Return to clinic for reevaluation if ineffective or other concerns.    LORETTA Cruz MD

## 2019-10-07 ENCOUNTER — MYC MEDICAL ADVICE (OUTPATIENT)
Dept: FAMILY MEDICINE | Facility: CLINIC | Age: 34
End: 2019-10-07

## 2019-10-08 ENCOUNTER — OFFICE VISIT (OUTPATIENT)
Dept: FAMILY MEDICINE | Facility: CLINIC | Age: 34
End: 2019-10-08
Payer: COMMERCIAL

## 2019-10-08 VITALS
DIASTOLIC BLOOD PRESSURE: 88 MMHG | SYSTOLIC BLOOD PRESSURE: 127 MMHG | HEIGHT: 61 IN | BODY MASS INDEX: 29.87 KG/M2 | HEART RATE: 109 BPM | OXYGEN SATURATION: 97 % | WEIGHT: 158.2 LBS | TEMPERATURE: 98.6 F | RESPIRATION RATE: 16 BRPM

## 2019-10-08 DIAGNOSIS — Z30.2 ENCOUNTER FOR STERILIZATION: Primary | ICD-10-CM

## 2019-10-08 PROBLEM — F17.200 SMOKER: Status: ACTIVE | Noted: 2019-10-08

## 2019-10-08 ASSESSMENT — MIFFLIN-ST. JEOR: SCORE: 1352.22

## 2019-10-08 NOTE — PROGRESS NOTES
Preceptor Attestation:   Patient seen, evaluated and discussed with the resident Dr Stallworth. I have verified the content of the note, which accurately reflects my assessment of the patient and the plan of care.  Supervising Physician:Tobias Bernabe MD  [facility]

## 2019-10-08 NOTE — PATIENT INSTRUCTIONS
Referral for :     OB/GYN    LOCATION/PLACE/Provider :    [facility]  DATE & TIME :    Oct. 22nd at 2:30pm  PHONE :     [phone]  FAX :    [phone]  Appointment made by clinic staff/:    Audelia

## 2019-10-08 NOTE — PROGRESS NOTES
"       HPI       Venice Gaffney is a 34 year old female with past medical history significant for anxiety who presents for:    Contraception counseling  Last seen by PCP for the same 06/2019, decided on Nexplanon which was placed 07/30/2019    Presenting today with complaint of heavy bleeding since Nexplanon placed  Cycling 2 weeks heavy bleeding and 2 weeks spotting  Heavy pad every 3-4 hours, quarter size clots  Waking up at night to change pad  No abdominal pain, no cramping    Prior to Nexplanon, menses were 4 days in length including 2 days of heavy bleeding with cramping    Used Implanon in the past that worked well for her, which was why she wanted Nexplanon initially  Wants Nexplanon out  Issues in the past with Depo and OCPs due to bleeding  Interested in having tubal ligation, states that she does not want any more children    Social history: Lives alone.  .  8-year-old son lives with her 50% of time.  Tobacco use: 1/2 ppd  ETOH: occasional  Illicit drug use: vaping    Patient Active Problem List   Diagnosis     Migraines     Anxiety with flying     Smoker       Current Outpatient Medications   Medication Sig Dispense Refill     hydrOXYzine (VISTARIL) 25 MG capsule Take 2 capsules (50 mg) by mouth every 12 hours as needed for itching 60 capsule 1     loratadine (CLARITIN) 10 MG tablet Take 1 tablet (10 mg) by mouth daily            Allergies   Allergen Reactions     Cat Hair Extract      Other reaction(s): Sneezing     Demerol [Meperidine]      Passing out     Imitrex [Sumatriptan]      Fish Allergy Rash            Review of Systems:   Complete 6-point ROS negative except as per HPI           Physical Exam:     Vitals:    10/08/19 1507   BP: 127/88   Pulse: 109   Resp: 16   Temp: 98.6  F (37  C)   TempSrc: Oral   SpO2: 97%   Weight: 71.8 kg (158 lb 3.2 oz)   Height: 1.545 m (5' 0.83\")     Body mass index is 30.06 kg/m .    GENERAL: healthy, alert and no distress  RESP: lungs clear to auscultation " - no rales, no rhonchi, no wheezes  CV: regular rates and rhythm, normal S1 S2, no S3 or S4 and no murmur, no click or rub  ABDOMEN: soft, no tenderness  MS: extremities- no gross deformities noted, no edema    Office Visit on 07/30/2019   Component Date Value Ref Range Status     HCG Qual Urine 07/30/2019 NEGATIVE  Negative Final       No results found for this or any previous visit (from the past 24 hour(s)).    Assessment and Plan     Venice was seen today for contraception and medication reconciliation.    Diagnoses and all orders for this visit:    Encounter for sterilization  Patient desires permanent sterilization with tubal ligation.    -     OB/GYN REFERRAL; Future    Schedule Nexplanon removal.    Options for treatment and follow-up care were reviewed with the patient and/or guardian. Venice Gaffney and/or guardian engaged in the decision making process and verbalized understanding of the options discussed and agreed with the final plan.  Precepted with Dr. Tobias Stallworth MD  [facility] Medicine Resident  Pager [phone]

## 2019-10-22 ENCOUNTER — MEDICAL CORRESPONDENCE (OUTPATIENT)
Dept: HEALTH INFORMATION MANAGEMENT | Facility: CLINIC | Age: 34
End: 2019-10-22

## 2019-10-27 ENCOUNTER — TRANSFERRED RECORDS (OUTPATIENT)
Dept: HEALTH INFORMATION MANAGEMENT | Facility: CLINIC | Age: 34
End: 2019-10-27

## 2019-11-08 ENCOUNTER — OFFICE VISIT (OUTPATIENT)
Dept: FAMILY MEDICINE | Facility: CLINIC | Age: 34
End: 2019-11-08
Payer: COMMERCIAL

## 2019-11-08 VITALS
HEART RATE: 98 BPM | WEIGHT: 155 LBS | TEMPERATURE: 98.3 F | OXYGEN SATURATION: 98 % | HEIGHT: 61 IN | SYSTOLIC BLOOD PRESSURE: 124 MMHG | BODY MASS INDEX: 29.27 KG/M2 | DIASTOLIC BLOOD PRESSURE: 89 MMHG | RESPIRATION RATE: 18 BRPM

## 2019-11-08 DIAGNOSIS — Z01.818 PREOP GENERAL PHYSICAL EXAM: Primary | ICD-10-CM

## 2019-11-08 DIAGNOSIS — Z30.2 REQUEST FOR STERILIZATION: ICD-10-CM

## 2019-11-08 ASSESSMENT — MIFFLIN-ST. JEOR: SCORE: 1340.84

## 2019-11-08 NOTE — PROGRESS NOTES
Preceptor Attestation:   Patient seen, evaluated and discussed with the resident. I have verified the content of the note, which accurately reflects my assessment of the patient and the plan of care.  Supervising Physician:Ganesh Garcia MD  [facility]

## 2019-11-08 NOTE — PROGRESS NOTES
"    [facility] [address] [place] [address]  Phone: [phone]  Fax: [phone]    11/8/2019    Adult PRE-OP Evaluation:    Venice Gaffney, 1985 presents for pre-operative evaluation and assessment as requested by Trevon Boyle, prior to undergoing surgery/procedure of bilateral tubal ligation for sterilization.     Patient had nexplanon placed as prior good outcomes with implanon. Has had heavier periods and no desire for further children, and has decided to proceed with surgical sterilization.    Proposed procedure: B/L tubal ligation     Date of Surgery/ Procedure: 11/15/19  Tubal Ligation  Hospital/Surgical Facility: [facility], Fax: [phone]     Primary Physician: Jesus Cruz  Type of Anesthesia Anticipated: General  History of anesthesia complications: NONE  History of  abnormal bleeding: NONE   History of blood transfusions: NO  Patient has a Health Care Directive or Living Will:  NO    Preoperative Questions   1. NO - Do you have a history of heart attack, stroke, stent, bypass or surgery on an artery in the head, neck, heart or legs?  2.YES - Do you ever have any pain or discomfort in your chest? \"During Panic Attacks only\"  3. NO - Have you ever had a severe pain across the front of your chest lasting for half an hour or more?  4. NO - Do you have a history of Congestive Heart Failure?  5. NO - Are you troubled by shortness of breath when: walking on the level/ up a slight hill/ at night?  6. NO - Does your chest ever sound wheezy or whistling?  7. NO - Do you currently have a cold, bronchitis or other respiratory infection?  8. NO - Have you had a cold, bronchitis or other respiratory infection within the last 2 weeks?  9. NO - Do you usually have a cough?  10. YES - Do you sometimes get pains in the calves of your legs when you walk? In ED on 10/27, likely Musculoskeletal strain, negative DVT US  11. NO - Do you or anyone in your family have previous history of " blood clots?  12. NO - Do you or does anyone in your family have a serious bleeding problem such as prolonged bleeding following surgeries or cuts?  13. NO - Have you ever had problems with anemia or been told to take iron pills?  14. YES- Have you had any abnormal blood loss such as black, tarry or bloody stools, or abnormal vaginal bleeding? Abnormal vaginal bleeding related to nexplanon  15. NO - Have you ever had a blood transfusion?  16. NO - Have you or any of your relatives ever had problems with anesthesia?  17. YES - Do you have sleep apnea, excessive snoring or daytime drowsiness? Positive for Snoring  18. NO - Do you have any prosthetic heart valves?  19. NO - Do you have prosthetic joints?  20. NO - Is there any chance that you may be pregnant?    Patient Active Problem List   Diagnosis     Migraines     Anxiety with flying     Smoker       hydrOXYzine (VISTARIL) 25 MG capsule, Take 2 capsules (50 mg) by mouth every 12 hours as needed for itching  loratadine (CLARITIN) 10 MG tablet, Take 1 tablet (10 mg) by mouth daily    No current facility-administered medications on file prior to visit.       OTC products: NSAIDS    Allergies   Allergen Reactions     Cat Hair Extract      Other reaction(s): Sneezing     Demerol [Meperidine]      Passing out     Imitrex [Sumatriptan]      Fish Allergy Rash     Latex Allergy: NO    Social History     Socioeconomic History     Marital status:      Spouse name: None     Number of children: None     Years of education: None     Highest education level: None   Occupational History     None   Social Needs     Financial resource strain: None     Food insecurity:     Worry: None     Inability: None     Transportation needs:     Medical: None     Non-medical: None   Tobacco Use     Smoking status: Current Every Day Smoker     Packs/day: 1.00     Years: 9.00     Pack years: 9.00     Types: Cigarettes     Smokeless tobacco: Never Used   Substance and Sexual Activity      "Alcohol use: Yes     Alcohol/week: 0.8 standard drinks     Types: 1 Standard drinks or equivalent per week     Comment: 1-2 times per month     Drug use: No     Sexual activity: Yes     Partners: Male     Birth control/protection: Condom   Lifestyle     Physical activity:     Days per week: None     Minutes per session: None     Stress: None   Relationships     Social connections:     Talks on phone: None     Gets together: None     Attends Quaker service: None     Active member of club or organization: None     Attends meetings of clubs or organizations: None     Relationship status: None     Intimate partner violence:     Fear of current or ex partner: None     Emotionally abused: None     Physically abused: None     Forced sexual activity: None   Other Topics Concern     Parent/sibling w/ CABG, MI or angioplasty before 65F 55M? No   Social History Narrative     None       REVIEW OF SYSTEMS:   Constitutional, HEENT, cardiovascular, pulmonary, gi and gu systems are negative, except as otherwise noted.    EXAM:     Patient Vitals for the past 24 hrs:   BP Temp Temp src Pulse Resp SpO2 Height Weight   11/08/19 0816 124/89 98.3  F (36.8  C) Oral 98 18 98 % 1.55 m (5' 1.02\") 70.3 kg (155 lb)     Body mass index is 29.26 kg/m .  GENERAL: healthy, alert and no distress  EYES: Eyes grossly normal to inspection, extraocular movements - intact, and PERRL  HENT: ear canals- normal;  Nose- normal; Mouth- no ulcers, no lesions  NECK: no tenderness, no adenopathy, no asymmetry, no masses, no stiffness; thyroid- normal to palpation  RESP: lungs clear to auscultation - no rales, no rhonchi, no wheezes  CV: regular rates and rhythm, normal S1 S2, no S3 or S4 and no murmur, no click or rub -  ABDOMEN: soft, no tenderness, no  hepatosplenomegaly, no masses, normal bowel sounds  MS: extremities- no gross deformities noted, no edema  SKIN: no suspicious lesions, no rashes  NEURO: strength and tone- normal, sensory exam- grossly " normal, mentation- intact, speech- normal  BACK: no CVA tenderness, no paralumbar tenderness  PSYCH: Alert and oriented times 3; speech- coherent , normal rate and volume; able to articulate logical thoughts  LYMPHATICS: ant. cervical- normal, post. cervical- normal    DIAGNOSTICS:      No labs or EKG required, relatively low risk surgery, young otherwise healthy patient.    RISK ASSESSMENT:     Cardiovascular Risk:  -Patient is able to participate in strenuous activities without chest pain.  -The patient does not have chest pain with exertion.  -Patient does not have a history of congestive heart failure.    -The patient does not have a history of stroke and does not have a history of valvular disease.    Pulmonary Risk:  -In terms of risk factors for pulmonary complication, the patient has risk factors with smoking history of 1PPD    Perioperative Complications:  -The patient does not have a history of bleeding or clotting problems in the past.    -The patient has not had surgery previously.    -The patient does not have a family history of any anesthesia or surgical complications.      IMPRESSION:   Reason for surgery/procedure: Sterilization    The proposed surgical procedure is considered Low-Moderate risk.    For above listed surgery and anesthesia:   Patient is at low risk for surgery/procedure and perioperative/procedure complications.    RECOMMENDATIONS:     Labs: No preop labs indicated. Low risk surgical candidate.     Fasting:  NPO for 12 hours prior to surgery    Preop Plan:  -Approval given to proceed with proposed procedure, without further diagnostic evaluation    Medications:  -Patient should take their regular medications the morning of surgery unless otherwise instructed.    -Hold aspirin 7 days prior to surgery.  -Hold ibuprofen for 5 days prior  -Patient was advised on smoking cessation for improved outcomes and wound healing, declines to quit at this time. Not a contraindication to proceeding  with procedure.   -Nexplanon to be removed following surgery      Jericho Mannan MS3    I was present with the medical student who participated in the service and in the documentation of this note. I have verified the history and personally performed the physical exam and medical decision making, and have verified the content of the note, which accurately reflects my assessment of the patient and the plan of care.    Benjamin Rosenstein, MD, MA  [facility]  P: [phone]    Precepted today with: Ganesh Garcia MD     Please contact our office if there are any further questions or information required about this patient.

## 2019-11-13 ASSESSMENT — MIFFLIN-ST. JEOR: SCORE: 1330.46

## 2019-11-15 ENCOUNTER — TRANSFERRED RECORDS (OUTPATIENT)
Dept: HEALTH INFORMATION MANAGEMENT | Facility: CLINIC | Age: 34
End: 2019-11-15

## 2019-11-15 ENCOUNTER — SURGERY - HEALTHEAST (OUTPATIENT)
Dept: SURGERY | Facility: AMBULATORY SURGERY CENTER | Age: 34
End: 2019-11-15

## 2019-11-15 ENCOUNTER — ANESTHESIA - HEALTHEAST (OUTPATIENT)
Dept: SURGERY | Facility: AMBULATORY SURGERY CENTER | Age: 34
End: 2019-11-15

## 2019-11-15 ASSESSMENT — MIFFLIN-ST. JEOR: SCORE: 1330.46

## 2019-12-01 ENCOUNTER — MYC REFILL (OUTPATIENT)
Dept: FAMILY MEDICINE | Facility: CLINIC | Age: 34
End: 2019-12-01

## 2019-12-01 DIAGNOSIS — F40.243 ANXIETY WITH FLYING: ICD-10-CM

## 2019-12-01 DIAGNOSIS — F41.1 GENERALIZED ANXIETY DISORDER: ICD-10-CM

## 2019-12-02 RX ORDER — HYDROXYZINE PAMOATE 25 MG/1
50 CAPSULE ORAL EVERY 12 HOURS PRN
Qty: 60 CAPSULE | Refills: 1 | Status: SHIPPED | OUTPATIENT
Start: 2019-12-02 | End: 2021-04-12

## 2020-03-02 ENCOUNTER — HEALTH MAINTENANCE LETTER (OUTPATIENT)
Age: 35
End: 2020-03-02

## 2020-12-20 ENCOUNTER — HEALTH MAINTENANCE LETTER (OUTPATIENT)
Age: 35
End: 2020-12-20

## 2021-03-29 ENCOUNTER — OFFICE VISIT (OUTPATIENT)
Dept: FAMILY MEDICINE | Facility: CLINIC | Age: 36
End: 2021-03-29
Payer: COMMERCIAL

## 2021-03-29 VITALS
HEART RATE: 96 BPM | DIASTOLIC BLOOD PRESSURE: 84 MMHG | WEIGHT: 180 LBS | RESPIRATION RATE: 16 BRPM | BODY MASS INDEX: 33.99 KG/M2 | SYSTOLIC BLOOD PRESSURE: 129 MMHG | OXYGEN SATURATION: 98 % | TEMPERATURE: 97.9 F | HEIGHT: 61 IN

## 2021-03-29 DIAGNOSIS — J30.2 SEASONAL ALLERGIES: ICD-10-CM

## 2021-03-29 DIAGNOSIS — F41.1 GENERALIZED ANXIETY DISORDER: Primary | ICD-10-CM

## 2021-03-29 PROCEDURE — 99214 OFFICE O/P EST MOD 30 MIN: CPT | Performed by: FAMILY MEDICINE

## 2021-03-29 RX ORDER — SERTRALINE HYDROCHLORIDE 25 MG/1
25 TABLET, FILM COATED ORAL DAILY
Qty: 30 TABLET | Refills: 1 | Status: SHIPPED | OUTPATIENT
Start: 2021-03-29 | End: 2021-04-12

## 2021-03-29 RX ORDER — CLONAZEPAM 1 MG/1
1 TABLET ORAL DAILY PRN
Qty: 20 TABLET | Refills: 0 | Status: SHIPPED | OUTPATIENT
Start: 2021-03-29 | End: 2021-10-25

## 2021-03-29 ASSESSMENT — MIFFLIN-ST. JEOR: SCORE: 1452.34

## 2021-03-29 ASSESSMENT — ANXIETY QUESTIONNAIRES
2. NOT BEING ABLE TO STOP OR CONTROL WORRYING: MORE THAN HALF THE DAYS
1. FEELING NERVOUS, ANXIOUS, OR ON EDGE: NEARLY EVERY DAY
6. BECOMING EASILY ANNOYED OR IRRITABLE: SEVERAL DAYS
7. FEELING AFRAID AS IF SOMETHING AWFUL MIGHT HAPPEN: NEARLY EVERY DAY
5. BEING SO RESTLESS THAT IT IS HARD TO SIT STILL: MORE THAN HALF THE DAYS
3. WORRYING TOO MUCH ABOUT DIFFERENT THINGS: MORE THAN HALF THE DAYS
GAD7 TOTAL SCORE: 16
IF YOU CHECKED OFF ANY PROBLEMS ON THIS QUESTIONNAIRE, HOW DIFFICULT HAVE THESE PROBLEMS MADE IT FOR YOU TO DO YOUR WORK, TAKE CARE OF THINGS AT HOME, OR GET ALONG WITH OTHER PEOPLE: VERY DIFFICULT

## 2021-03-29 ASSESSMENT — PATIENT HEALTH QUESTIONNAIRE - PHQ9
SUM OF ALL RESPONSES TO PHQ QUESTIONS 1-9: 16
5. POOR APPETITE OR OVEREATING: NEARLY EVERY DAY

## 2021-03-29 NOTE — PROGRESS NOTES
HPI:   Venice Gaffney is a 35 year old  female who presents for:    Chief Complaint   Patient presents with     Anxiety     follow-up, ge back on daily medication     Tinnitus     was seen in Urgent care a couple months ago, got better for a few days after starting allergy medicaiton but has gotten worse over the past couple days, constant, more in left ear but bothers bother ears     Medication Reconciliation     pt is taking nasal spray, allegra, benadryl and sudafed       35-year-old female with greater than 10-year history of generalized anxiety.  Presents today for management of anxiety as well as ear fullness and tinnitus.    Anxiety: She has had a very stressful year with the Covid 19 pandemic.  She lost her job and was out of work for about 4 months.  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  She has a son with autism.  She got  earlier this year.  Her her son spends a majority of the time at his father's home.  She has had a significant increase in her anxiety symptoms with a anxiety screen [id number] today of 17.  She also has intermittent depressive symptoms including anhedonia, difficulty falling asleep, overeating, difficulty with concentration.  She she denies any thoughts of suicide, guilt, or feeling bad about herself.  Her anxiety symptoms make it difficult for her to work, concentrate, sleep, and enjoy life.  No significant weight change, new heat or cold intolerance, skin changes, bowel changes.    She has been using hydroxyzine intermittently over the past year to help control anxiety symptoms.  She sometimes uses this during the day, and more often at night which is helpful for sleep and anxiety.  Over the past couple of months it has not been effective in reducing her anxiety symptoms or helping with sleep.  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  These  "techniques are not as effective recently with her increased anxiety symptoms.    She does not like the idea in the past of taking a daily controller medication, but has done so when her anxiety symptoms were intolerable.  Despite her own history of assisting with counseling therapy, she is not currently in counseling.    She feels her life is now significantly more stable than it was 6 months ago, which is why she is surprised that her anxiety has not improved.  She has found new employment.  She is comfortable with her divorce and current custody arrangements.  She is hopeful for the future.    A primary care PTSD screen is negative    She denies past symptoms of krista.  No periods of time where she needs to reduce sleep.  No periods of time of increased energy despite decreased sleep.  Increased talking, goal-directed activity, or feelings of \"being driven by a motor\".  She did not experience increased anxiety with past trials of SSRIs/SNRI medication.    Back in June 2019 she used 1 mg of Klonopin tablets for air travel anxiety.   She tolerated the Klonopin well which was very effective at reducing her anxiety and helping her sleep.  She had 10 tablets dispensed at that time for a series of connecting flights, and has not used this medication or had refills of Klonopin since that time.    In the past she has tried Paxil (approximately 5 years ago) and had some reduction in her anxiety symptoms, but chart review reveals concerns about weight gain and decreased libido causing her to switch to Effexor.  Effexor again had some reduction in symptoms, concerns about weight gain, and \"brain zaps\" when discontinued.  She also used BuSpar for period of time which she cannot recall the details of response but thinks it was helpful.    Tinnitus/ear fullness: She had a couple months of intermittent ear fullness and ringing ears.  Worse in her left ear than her right.  She was seen in urgent care a month or 2 ago and started " on Sudafed as well as nonsedating antihistamine and nasal steroid.  Her symptoms initially improved with this therapy, but now seem to wax and wane over the past couple of weeks.  No shortness of breath.  No cough.  No nasal drainage.  No facial pain.  She thinks her symptoms are likely mild but her anxiety causmoke and use vapeses her to worry her than usual about these symptoms.    Social history: Uses tobacco products.  During the winter was drinking 2 to 3 glasses of wine per week, but has stopped alcohol over the past month due to anxiety.  Intermittent caffeine use, although she is conscious to reduce caffeine intake.     Medications: Recently using hydroxyzine daily.  Claritin/Allegra without decongestant.  Nasal steroid.  Intermittent use of pseudoephedrine.         PMHX:     Patient Active Problem List   Diagnosis     Migraines     Anxiety with flying     Smoker       Current Outpatient Medications   Medication Sig Dispense Refill     clonazePAM (KLONOPIN) 1 MG tablet Take 1 tablet (1 mg) by mouth daily as needed for anxiety 20 tablet 0     hydrOXYzine (VISTARIL) 25 MG capsule Take 2 capsules (50 mg) by mouth every 12 hours as needed for itching 60 capsule 1     sertraline (ZOLOFT) 25 MG tablet Take 1 tablet (25 mg) by mouth daily 30 tablet 1     loratadine (CLARITIN) 10 MG tablet Take 1 tablet (10 mg) by mouth daily         Social History     Tobacco Use     Smoking status: Current Every Day Smoker     Packs/day: 1.00     Years: 9.00     Pack years: 9.00     Types: Other     Smokeless tobacco: Never Used     Tobacco comment: Vaping   Substance Use Topics     Alcohol use: Yes     Alcohol/week: 0.8 standard drinks     Types: 1 Standard drinks or equivalent per week     Comment: 1-2 times per month     Drug use: No       Social History     Social History Narrative     Not on file       Allergies   Allergen Reactions     Cat Hair Extract      Other reaction(s): Sneezing     Demerol [Meperidine]      Passing  "out     Imitrex [Sumatriptan]      Fish Allergy Rash       No results found for this or any previous visit (from the past 24 hour(s)).         Review of Systems:     General: No fevers or recent illness  ENT: Bilateral ear fullness.  Intermittent ringing of the ears bilaterally.  No facial pain.  No vision changes.  No significant nasal discharge.  No hearing loss. No recent headache.  Cardiovascular: No chest pain  Respiratory: No shortness of breath.  No cough.  GI: No nausea or vomiting  Skin: No skin skin hair or nail changes         Physical Exam:     Vitals:    03/29/21 1432 03/29/21 1434   BP: (!) 148/97 129/84   BP Location: Right arm Right arm   Cuff Size: Adult Regular Adult Regular   Pulse: 96 96   Resp: 16    Temp: 97.9  F (36.6  C)    TempSrc: Oral    SpO2: 98%    Weight: 81.6 kg (180 lb)    Height: 1.555 m (5' 1.22\")      Body mass index is 33.77 kg/m .    General: Alert, no acute distress.  Relates her own history with fluent speech.  HEENT: Head is free of trauma.   Sclerae non-icteric. PERRL, Moist oral mucus membranes, no tonsilar hypertrophy or exudate. TM's white with normal bony and light landmarks.  Neck: Thyroid normal size without adenopathy  Resp: Clear to auscultation bilaterally  CV: Regular rate and rhythm  Skin: exposed skin free of rash  Psych: Speech fluent. Thoughts logical.      Assessment and Plan   1. Generalized anxiety disorder  Longstanding history of generalized anxiety, with increased symptoms over the past few months complicating social stressors.    We discussed various treatment options including SSRI, SNRI, counseling, short-term use of benzodiazepines, and the risks and potential benefits of the options.    After discussion patient elected to start a daily controller medication in the SSRI family, sertraline 25 mg each day which I anticipate titrating the dose upward after tolerability is established.  We discussed common side effects, and less common but serious side " effects such as suicidal ideation which requires immediate emergency evaluation.    Continue relaxation exercises. Reconsider counseling therapy.    Due to her current high level anxiety and difficulty with sleep we discussed a short course of clonazepam to assist with anxiety and sleep.  We discussed the risk of this medication including addiction, rebound anxiety, sedation, falls, and the importance of not driving for at least 8 hours after taking this medication.  We also discussed not mixing this medication with hydroxyzine or alcohol.  She will use clonazepam 0.5 mg each evening for the next 10 nights approximately 30-60 minutes before bedtime, she could increase the dose to 1 mg at bedtime if no anxiolytic effect after 2 hours.  She will discontinue Klonopin after 10 days, and replace with hydroxyzine 50 mg 30 minutes before bed from day 11-18, and then stop.    She was given a prescription for Klonopin 1 mg tablets, #20.  Anticipate she may need Klonopin for breakthrough anxiety during the time we are titrating sertraline over the next 8 to 12 weeks.  She may use the Klonopin 1 mg once daily for severe anxiety not responsive to relaxation techniques.    I recommend she discontinue pseudoephedrine due to her anxiety.  Recommend she wean caffeine and discontinue over the next few weeks.  We will revisit tobacco cessation when anxiety is under better control.    The prescribers database was reviewed revealing no controlled substances dispensed other than pseudoephedrine 120 mg tablets, #20 on 1/21/2021.    - clonazePAM (KLONOPIN) 1 MG tablet; Take 1 tablet (1 mg) by mouth daily as needed for anxiety  Dispense: 20 tablet; Refill: 0  - sertraline (ZOLOFT) 25 MG tablet; Take 1 tablet (25 mg) by mouth daily  Dispense: 30 tablet; Refill: 1      2. Tinnitus/ear fullness due to seasonal allergies: Ear exam today is unremarkable.  Long history of seasonal allergies and suspect allergies are playing a role.  Continue  nonsedating antihistamine and nasal steroid.    3. Tobacco use: We will reevaluate tobacco cessation as her anxiety falls under better control    Follow up: 2 to 3 weeks, anticipate upward titration of sertraline dose  Options for treatment and follow-up care were reviewed with the patient and/or guardian. Venice Gaffney and/or guardian engaged in the decision making process and verbalized understanding of the options discussed and agreed with the final plan.    Jesus Cruz MD  Faculty - [facility] Medicine Residency Program

## 2021-03-29 NOTE — PATIENT INSTRUCTIONS
For anxiety:  Start Sertraline 25mg once daily each morning    For the next 10 nights, take Klonopin 1/2 tablet (0.5mg) 30 minutes before bed for sleep and anxiety    On days 11-18, take hydroxyzine 25mg 30 minutes before bed then stop    You may take the Klonopin 1/2 tablet (0.5mg) up to once daily as needed for anxiety while you are starting SERTRALINE and titrating up the dose over the next 2 months.    Return to see Dr. Cruz in 2 to 3 weeks to follow up on Sertraline and discuss increasing the dose

## 2021-03-30 ASSESSMENT — ANXIETY QUESTIONNAIRES: GAD7 TOTAL SCORE: 16

## 2021-04-12 ENCOUNTER — OFFICE VISIT (OUTPATIENT)
Dept: FAMILY MEDICINE | Facility: CLINIC | Age: 36
End: 2021-04-12
Payer: COMMERCIAL

## 2021-04-12 VITALS
RESPIRATION RATE: 16 BRPM | HEIGHT: 61 IN | DIASTOLIC BLOOD PRESSURE: 78 MMHG | HEART RATE: 79 BPM | SYSTOLIC BLOOD PRESSURE: 114 MMHG | OXYGEN SATURATION: 99 % | WEIGHT: 178 LBS | BODY MASS INDEX: 33.61 KG/M2 | TEMPERATURE: 98.2 F

## 2021-04-12 DIAGNOSIS — F41.1 GENERALIZED ANXIETY DISORDER: ICD-10-CM

## 2021-04-12 PROCEDURE — 99213 OFFICE O/P EST LOW 20 MIN: CPT | Performed by: FAMILY MEDICINE

## 2021-04-12 RX ORDER — HYDROXYZINE PAMOATE 25 MG/1
50 CAPSULE ORAL EVERY 12 HOURS PRN
Qty: 60 CAPSULE | Refills: 1 | Status: SHIPPED | OUTPATIENT
Start: 2021-04-12 | End: 2021-12-14

## 2021-04-12 ASSESSMENT — MIFFLIN-ST. JEOR: SCORE: 1439.78

## 2021-04-12 NOTE — PATIENT INSTRUCTIONS
Increase Zoloft to 50mg daily    Take Hydroxyzine 25mg each evening for sleep for the next 2 weeks while you get used to the new dose of zoloft; then return to as needed only    See Dr. Cruz in about 4 weeks, earlier as needed

## 2021-04-24 ENCOUNTER — HEALTH MAINTENANCE LETTER (OUTPATIENT)
Age: 36
End: 2021-04-24

## 2021-06-01 DIAGNOSIS — F41.1 GENERALIZED ANXIETY DISORDER: ICD-10-CM

## 2021-06-01 NOTE — TELEPHONE ENCOUNTER
Message to physician:     Date of last visit: 4/12/2021    Date of next visit if scheduled none    Last Comprehensive Metabolic Panel:  Sodium   Date Value Ref Range Status   08/13/2018 142.0 133.0 - 144.0 mmol/L Final     Potassium   Date Value Ref Range Status   08/13/2018 4.4 3.4 - 5.3 mmol/L Final     Chloride   Date Value Ref Range Status   08/13/2018 102.0 94.0 - 109.0 mmol/L Final     Carbon Dioxide   Date Value Ref Range Status   08/13/2018 29.0 20.0 - 32.0 mmol/L Final     Glucose   Date Value Ref Range Status   08/13/2018 100.0 60.0 - 109.0 mg/dL Final     Urea Nitrogen   Date Value Ref Range Status   08/13/2018 9.0 5.0 - 24.0 mg/dL Final     Creatinine   Date Value Ref Range Status   08/13/2018 1.0 0.6 - 1.3 mg/dL Final     Calcium   Date Value Ref Range Status   08/13/2018 9.6 8.5 - 10.4 mg/dL Final       BP Readings from Last 3 Encounters:   04/12/21 114/78   03/29/21 129/84   11/08/19 124/89       No results found for: A1C             Please complete refill and CLOSE ENCOUNTER.  Closing the encounter signifies the refill is complete.

## 2021-06-02 VITALS — WEIGHT: 155 LBS | HEIGHT: 61 IN | BODY MASS INDEX: 29.27 KG/M2

## 2021-06-03 NOTE — ANESTHESIA CARE TRANSFER NOTE
Last vitals:   Vitals:    11/15/19 0804   BP: 124/66   Pulse: 99   Resp: 16   Temp: 36.9  C (98.4  F)   SpO2: 96%     Patient's level of consciousness is drowsy  Spontaneous respirations: yes  Maintains airway independently: yes  Dentition unchanged: yes  Oropharynx: oropharynx clear of all foreign objects    QCDR Measures:  ASA# 20 - Surgical Safety Checklist: WHO surgical safety checklist completed prior to induction    PQRS# 430 - Adult PONV Prevention: 4558F - Pt received => 2 anti-emetic agents (different classes) preop & intraop  ASA# 8 - Peds PONV Prevention: NA - Not pediatric patient, not GA or 2 or more risk factors NOT present  PQRS# 424 - Mirian-op Temp Management: 4559F - At least one body temp DOCUMENTED => 35.5C or 95.9F within required timeframe  PQRS# 426 - PACU Transfer Protocol: - Transfer of care checklist used  ASA# 14 - Acute Post-op Pain: ASA14A - Patient experienced pain >= 7 out of 10

## 2021-06-03 NOTE — ANESTHESIA POSTPROCEDURE EVALUATION
Patient: Venice Gaffney  LAPAROSCOPIC BILATERAL TUBAL LIGATION, HYSTEROSCOPY, DILATION AND CURETTAGE, ENDOMETRIAL ABLATION, REMOVAL OF NEXPLANON, HYSTEROSCOPY, DILATION AND CURETTAGE, ENDOMETRIAL ABLATION, REMOVAL OF NEXPLANON  Anesthesia type: general    Patient location: Phase II Recovery  Last vitals:   Vitals Value Taken Time   /89 11/15/2019 11:01 AM   Temp 36.6  C (97.9  F) 11/15/2019 10:47 AM   Pulse 76 11/15/2019 11:01 AM   Resp 16 11/15/2019 10:47 AM   SpO2 97 % 11/15/2019 11:01 AM   Vitals shown include unvalidated device data.  Post vital signs: stable  Level of consciousness: awake and responds to simple questions  Post-anesthesia pain: pain controlled  Post-anesthesia nausea and vomiting: no  Pulmonary: unassisted, return to baseline  Cardiovascular: stable and blood pressure at baseline  Hydration: adequate  Anesthetic events: no    QCDR Measures:  ASA# 11 - Mirian-op Cardiac Arrest: ASA11B - Patient did NOT experience unanticipated cardiac arrest  ASA# 12 - Mirian-op Mortality Rate: ASA12B - Patient did NOT die  ASA# 13 - PACU Re-Intubation Rate: ASA13B - Patient did NOT require a new airway mgmt  ASA# 10 - Composite Anes Safety: ASA10A - No serious adverse event    Additional Notes:

## 2021-06-04 VITALS — WEIGHT: 155 LBS | HEIGHT: 61 IN | BODY MASS INDEX: 29.27 KG/M2

## 2021-08-17 DIAGNOSIS — F41.1 GENERALIZED ANXIETY DISORDER: ICD-10-CM

## 2021-10-03 ENCOUNTER — HEALTH MAINTENANCE LETTER (OUTPATIENT)
Age: 36
End: 2021-10-03

## 2021-10-12 ENCOUNTER — TELEPHONE (OUTPATIENT)
Dept: FAMILY MEDICINE | Facility: CLINIC | Age: 36
End: 2021-10-12

## 2021-10-12 NOTE — TELEPHONE ENCOUNTER
Date of discharge: 10/11/2021  Facility of discharge: [facility]  Patient concerns about condition: No concerns at this time.  Patient concerns about medications: No concerns at this time.  Full med reconciliation will be completed at clinic visit.  Patient concerns about transitioning: No concerns at this time.    Was the patient diagnosed with COVID while in the hospital? No    Clinic office visit appointment date: 10/25/2021  Patient reminded to bring all medications (prescription and over-the-counter) to clinic appointment: Yes

## 2021-10-14 ENCOUNTER — MYC MEDICAL ADVICE (OUTPATIENT)
Dept: FAMILY MEDICINE | Facility: CLINIC | Age: 36
End: 2021-10-14

## 2021-10-15 ENCOUNTER — OFFICE VISIT (OUTPATIENT)
Dept: FAMILY MEDICINE | Facility: CLINIC | Age: 36
End: 2021-10-15
Payer: COMMERCIAL

## 2021-10-15 VITALS
HEIGHT: 61 IN | DIASTOLIC BLOOD PRESSURE: 83 MMHG | OXYGEN SATURATION: 99 % | TEMPERATURE: 98.1 F | SYSTOLIC BLOOD PRESSURE: 120 MMHG | BODY MASS INDEX: 32.66 KG/M2 | WEIGHT: 173 LBS | HEART RATE: 93 BPM

## 2021-10-15 DIAGNOSIS — K57.32 DIVERTICULITIS OF COLON: ICD-10-CM

## 2021-10-15 DIAGNOSIS — Z23 NEED FOR PROPHYLACTIC VACCINATION AND INOCULATION AGAINST INFLUENZA: ICD-10-CM

## 2021-10-15 DIAGNOSIS — R10.32 ABDOMINAL PAIN, LEFT LOWER QUADRANT: Primary | ICD-10-CM

## 2021-10-15 LAB
ANION GAP SERPL CALCULATED.3IONS-SCNC: 11 MMOL/L (ref 5–18)
BUN SERPL-MCNC: 10 MG/DL (ref 8–22)
CALCIUM SERPL-MCNC: 10.4 MG/DL (ref 8.5–10.5)
CHLORIDE BLD-SCNC: 103 MMOL/L (ref 98–107)
CO2 SERPL-SCNC: 22 MMOL/L (ref 22–31)
CREAT SERPL-MCNC: 0.86 MG/DL (ref 0.6–1.1)
ERYTHROCYTE [DISTWIDTH] IN BLOOD BY AUTOMATED COUNT: 12.3 % (ref 10–15)
GFR SERPL CREATININE-BSD FRML MDRD: 87 ML/MIN/1.73M2
GLUCOSE BLD-MCNC: 92 MG/DL (ref 70–125)
HCT VFR BLD AUTO: 41.4 % (ref 35–47)
HGB BLD-MCNC: 14.1 G/DL (ref 11.7–15.7)
MCH RBC QN AUTO: 29.6 PG (ref 26.5–33)
MCHC RBC AUTO-ENTMCNC: 34.1 G/DL (ref 31.5–36.5)
MCV RBC AUTO: 87 FL (ref 78–100)
PLATELET # BLD AUTO: 341 10E3/UL (ref 150–450)
POTASSIUM BLD-SCNC: 4.6 MMOL/L (ref 3.5–5)
RBC # BLD AUTO: 4.76 10E6/UL (ref 3.8–5.2)
SODIUM SERPL-SCNC: 136 MMOL/L (ref 136–145)
WBC # BLD AUTO: 8.6 10E3/UL (ref 4–11)

## 2021-10-15 PROCEDURE — 80048 BASIC METABOLIC PNL TOTAL CA: CPT | Performed by: STUDENT IN AN ORGANIZED HEALTH CARE EDUCATION/TRAINING PROGRAM

## 2021-10-15 PROCEDURE — 85027 COMPLETE CBC AUTOMATED: CPT | Performed by: STUDENT IN AN ORGANIZED HEALTH CARE EDUCATION/TRAINING PROGRAM

## 2021-10-15 PROCEDURE — 36415 COLL VENOUS BLD VENIPUNCTURE: CPT | Performed by: STUDENT IN AN ORGANIZED HEALTH CARE EDUCATION/TRAINING PROGRAM

## 2021-10-15 PROCEDURE — 90471 IMMUNIZATION ADMIN: CPT | Performed by: STUDENT IN AN ORGANIZED HEALTH CARE EDUCATION/TRAINING PROGRAM

## 2021-10-15 PROCEDURE — 90686 IIV4 VACC NO PRSV 0.5 ML IM: CPT | Performed by: STUDENT IN AN ORGANIZED HEALTH CARE EDUCATION/TRAINING PROGRAM

## 2021-10-15 PROCEDURE — 99214 OFFICE O/P EST MOD 30 MIN: CPT | Mod: 25 | Performed by: STUDENT IN AN ORGANIZED HEALTH CARE EDUCATION/TRAINING PROGRAM

## 2021-10-15 RX ORDER — OXYCODONE HYDROCHLORIDE 5 MG/1
5 TABLET ORAL EVERY 6 HOURS PRN
Qty: 6 TABLET | Refills: 0 | Status: SHIPPED | OUTPATIENT
Start: 2021-10-15 | End: 2021-10-18

## 2021-10-15 RX ORDER — GABAPENTIN 100 MG/1
100 CAPSULE ORAL 3 TIMES DAILY
Qty: 90 CAPSULE | Refills: 0 | Status: SHIPPED | OUTPATIENT
Start: 2021-10-15 | End: 2021-10-25

## 2021-10-15 ASSESSMENT — MIFFLIN-ST. JEOR: SCORE: 1414.35

## 2021-10-15 NOTE — PROGRESS NOTES
"Assessment & Plan   Need for prophylactic vaccination and inoculation against influenza  - INFLUENZA VACCINE IM > 6 MONTHS VALENT IIV4 (AFLURIA/FLUZONE)    Abdominal pain, left lower quadrant - Diverticulitis of Sigmoid Colon  36 female here for hospital follow-up for localized perforated sigmoid diverticulitis. Patient has persistent abdominal pain in LLQ.   - gabapentin (NEURONTIN) 100 MG capsule; Take 1 capsule (100 mg) by mouth 3 times daily  - oxyCODONE (ROXICODONE) 5 MG tablet; Take 1 tablet (5 mg) by mouth every 6 hours as needed for severe pain  - Continue abx regimen  - Long discussion on symptoms and course for diverticulitis, patient is feeling stable at this time  - Given her recent admission, discharge, and readmission for several days did have long discussion on reasons to return vs reasons to follow up with ED  - Patent verbalized understanding and reasons to return sooner  - Schedule surgery follow up sooner - now on 10/19/21}     Tobacco Cessation:   reports that she has been smoking other. She has a 9.00 pack-year smoking history. She has never used smokeless tobacco.  Tobacco Cessation Action Plan: Not discussed at this visit    BMI:   Estimated body mass index is 33.63 kg/m  as calculated from the following:    Height as of 4/12/21: 1.549 m (5' 1\").    Weight as of 4/12/21: 80.7 kg (178 lb).   Weight management plan: Not discussed at this visit    FUTURE APPOINTMENTS:       - Follow-up visit scheduled in 2 weeks    Maico Perdomo MS3    Resident/Fellow Attestation   I, Suman Shaw, was present with the medical/THOMPSON student who participated in the service and in the documentation of the note.  I have verified the history and personally performed the physical exam and medical decision making.  I agree with the assessment and plan of care as documented in the note.      Suman Shaw MD  PGY3      Flores Millard is a 36 year old who presents for the following health " issues.     HPI   Hospital Follow-up Visit:    Hospital/Nursing Home/IP Rehab Facility: [facility]  Date of Admission: (Admit 1) 09/24/2021, (Admit 2) 10/03/2021  Date of Discharge: (discharge 1) 09/27/2021, (discharge 2) 10/11/2021  Reason(s) for Admission: Abdominal Pain - Proximal sigmoid diverticulitis      Was your hospitalization related to COVID-19? No   Problems taking medications regularly: None  Medication changes since discharge: None  Problems adhering to non-medication therapy:  None    Summary of hospitalization:  CareEverywhere information obtained and reviewed  Diagnostic Tests/Treatments reviewed.  Follow up needed: Schedule surgery follow-up sooner  Other Healthcare Providers Involved in Patient s Care:         Surgical follow-up appointment - 10/26/2021  Update since discharge: Stable. Pain is improved but still present.      Post Discharge Medication Reconciliation: discharge medications reconciled and changed, per note/orders.  Plan of care communicated with patient     Patient was recently admitted for perforated proximal sigmoid diverticulitis. Is following her abx course routinely. Pain has been constant and stable since discharge. Her pain feels more localized to the LLQ then in the past which was more diffuse. Reports continued limited ADLs. She has had no fever, cold, cough symptoms. Reports her stools have been more frequent, soft but formed, and paler in color than her typical bowel movements. She has some abdominal cramps with bowel movements. Has support for ADLs at home from family.     Review of Systems   Skin: negative  Eyes: negative  Ears/Nose/Throat: negative  Respiratory: No shortness of breath, dyspnea on exertion, cough, or hemoptysis  Cardiovascular: negative  Gastrointestinal: LLQ crampy pain 2/2 diverticulitis, occasionally sharp with exacerbation. Now localized. Increased stool frequency - soft, form filled, No blood, mucus.      Genitourinary:  "negative  Musculoskeletal: negative  Neurologic: Continued tingling of fingers and lower face. Otherwise negative      Objective    Ht 1.553 m (5' 1.14\")   Wt 78.5 kg (173 lb)   BMI 32.54 kg/m    Body mass index is 32.54 kg/m .     Physical Exam   GENERAL: healthy, alert and no distress  NECK: no adenopathy, no asymmetry, masses, or scars and thyroid normal to palpation  RESP: lungs clear to auscultation - no rales, rhonchi or wheezes  CV: regular rate and rhythm, normal S1 S2, no S3 or S4, no murmur, click or rub, no peripheral edema and peripheral pulses strong  ABDOMEN: soft, nontender, tenderness LLQ, no organomegaly or masses, bowel sounds normal and no palpable or pulsatile masses  MS: no gross musculoskeletal defects noted, no edema      "

## 2021-10-25 ENCOUNTER — OFFICE VISIT (OUTPATIENT)
Dept: FAMILY MEDICINE | Facility: CLINIC | Age: 36
End: 2021-10-25
Payer: COMMERCIAL

## 2021-10-25 VITALS
DIASTOLIC BLOOD PRESSURE: 81 MMHG | HEART RATE: 86 BPM | OXYGEN SATURATION: 98 % | BODY MASS INDEX: 32.85 KG/M2 | HEIGHT: 61 IN | SYSTOLIC BLOOD PRESSURE: 121 MMHG | TEMPERATURE: 98.4 F | RESPIRATION RATE: 16 BRPM | WEIGHT: 174 LBS

## 2021-10-25 DIAGNOSIS — K57.32 DIVERTICULITIS OF COLON: ICD-10-CM

## 2021-10-25 DIAGNOSIS — R20.2 PARESTHESIA: Primary | ICD-10-CM

## 2021-10-25 DIAGNOSIS — R10.32 ABDOMINAL PAIN, LEFT LOWER QUADRANT: ICD-10-CM

## 2021-10-25 DIAGNOSIS — M25.511 ACUTE PAIN OF RIGHT SHOULDER: ICD-10-CM

## 2021-10-25 PROCEDURE — 99214 OFFICE O/P EST MOD 30 MIN: CPT | Performed by: FAMILY MEDICINE

## 2021-10-25 RX ORDER — GABAPENTIN 100 MG/1
300 CAPSULE ORAL 3 TIMES DAILY
Qty: 90 CAPSULE | Refills: 0
Start: 2021-10-25 | End: 2021-11-08

## 2021-10-25 ASSESSMENT — MIFFLIN-ST. JEOR: SCORE: 1420.13

## 2021-10-25 NOTE — PROGRESS NOTES
Hospitalization Follow-up Visit         Hospitals in [place]       Hospital Follow-up Visit:    Hospital:  [facility]   Date of Admission: October 3, 2021  Date of Discharge: October 11, 2021  Reason(s) for Admission: Diverticulitis with localized perforation            Problems taking medications regularly:  None       Post Discharge Medication Reconciliation: discharge medications reconciled and changed, per note/orders.  Discharge medications include oxycodone 5 mg, which is now been discontinued  Completing a 14-day course of Augmentin 875/125 mg 1 tablet twice daily         Problems adhering to non-medication therapy:  None       Medications reviewed by: by myself. Findings include   Discharged on Augmentin x14 days  Oxycodone which she has discontinued.    Summary of hospitalization:  [facility] discharge summary reviewed  Discharge summary from [facility] was reviewed at the time of the visit  Diagnostic Tests/Treatments reviewed.  Follow up needed: Follow-up with surgery Dr. Alexandra Sheridan MD in 2 weeks  Plan for colonoscopy in 6 weeks    Other Healthcare Providers Involved in Patient s Care:         Surgery  Update since discharge: improved.   Plan of care communicated with patient            Since the time of hospital discharge her left-sided abdominal pain has significantly improved.  Continues to have a aching pain in her left side.  Worse when she pushes on her left side.  She is tolerating small meals.  No nausea or vomiting.  Passing stool.  No fevers or chills.  Continues on Augmentin twice daily.    Right shoulder pain has been worse over the past week.  She has been sleeping on her right side due to discomfort in her left abdomen if she sleeps on her left side.  No history of trauma.  This is exacerbated right shoulder pain that has bothered her for months.  She has a aching sensation at the lateral shoulder as well as tingling pain at the anterior lateral right shoulder.  No  "strength changes.  No neck pain.           Review of Systems:   CONSTITUTIONAL:  no unexpected change in weight  SKIN: no worrisome rashes  ENT: no upper respiratory symptoms.  Covid screening questions are negative.  RESP: no significant cough, no shortness of breath  CV: no chest pain, no palpitations  GI: no nausea, no vomiting, no constipation, no diarrhea.  See HPI            Physical Exam:     Vitals:    10/25/21 1214   BP: 121/81   BP Location: Left arm   Cuff Size: Adult Regular   Pulse: 86   Resp: 16   Temp: 98.4  F (36.9  C)   TempSrc: Oral   SpO2: 98%   Weight: 78.9 kg (174 lb)   Height: 1.555 m (5' 1.22\")     Body mass index is 32.64 kg/m .    General: Alert no distress.  Relates her own history  HEENT: Sclera nonicteric noninjected.  Moist mucous membranes.  No tonsil hypertrophy or exudate.  No midline neck tenderness  Cardiovascular: Regular rate rhythm without murmur  Respiratory: Clear to auscultation bilaterally  Abdomen: Soft.  Tender at left mid abdomen.  No peritoneal signs.  No rebound.  Extremities: Right shoulder: No tenderness along the clavicle, acromion, or proximal humerus.  Full range of motion.  5 out of 5 strength at the shoulder, bicep, tricep.  Sensation to light touch intact throughout the arm and hand.  C[place]anza and Neer's are equivocal.         Results:   CT scan reports from her recent hospitalization were reviewed at today's visit    Assessment and Plan      Venice was seen today for hospital f/u, pain and medication reconciliation.    Diagnoses and all orders for this visit:    Paresthesia    Abdominal pain, left lower quadrant  -     gabapentin (NEURONTIN) 100 MG capsule; Take 3 capsules (300 mg) by mouth 3 times daily    Acute pain of right shoulder    Diverticulitis of colon      Right shoulder pain: Strength and function intact.  Suspect lying on her right side over the past couple of weeks has exacerbated her chronic right shoulder pain.  Suspect impingement " syndrome  Titrate Neurontin for symptom control  Reevaluate if no significant improvement over the next 10 days  Plan for a physical therapy assessment when she is recovered from diverticulitis  Pursue imaging if any worsening symptoms, or not improving range of motion exercises, or symptoms persist beyond 4 weeks    Diverticulitis:   Complete course of Augmentin  Follow-up with Dr. Alexandra Sheridan MD [place] surgery in 2 weeks  Plan is for colonoscopy in about 6 weeks when her diverticulitis is controlled    Anticipatory guidance given for when to return to emergency department for any fevers, increased pain, unable to tolerate diet/fluids    History of depression/anxiety: Exacerbation of depressive symptoms during her diverticulitis.  She feels supported.  No suicidal ideation.  Plans to continue sertraline at current 50 mg dose.  Call if any worsening symptoms  Return no later than 4 weeks if no improvement as her diverticulitis improves.          E&M code to be billed if TCM cannot be: 16550    Type of decision making: Moderate complexity (60294)      Options for treatment and follow-up care were reviewed with the patient  Venice Gaffney   engaged in the decision making process and verbalized understanding of the options discussed and agreed with the final plan.      Jesus Cruz MD

## 2021-10-25 NOTE — PATIENT INSTRUCTIONS
For your right shoulder pain and tingling sensation, worse at night-  Increase GABAPENTIN to 300mg each evening, and continue 100mg morning and afternoon    If you continue to have pain bothersome during the day, you can increase your morning and afternoon dose up to a maximum of 300mg three times daily    See Dr. Sheridan (surgery) as scheduled for colonoscopy and follow up      See Dr. Cruz in 4 weeks if you are still having shoulder pain or tingling sensations

## 2021-10-28 ENCOUNTER — TELEPHONE (OUTPATIENT)
Dept: FAMILY MEDICINE | Facility: CLINIC | Age: 36
End: 2021-10-28

## 2021-10-28 NOTE — TELEPHONE ENCOUNTER
Called pt to inform a letter was faed to her employer at [phone]. Asked that she call us back if the fax did not reach her employer.

## 2021-11-01 ENCOUNTER — TELEPHONE (OUTPATIENT)
Dept: FAMILY MEDICINE | Facility: CLINIC | Age: 36
End: 2021-11-01

## 2021-11-01 ENCOUNTER — TRANSFERRED RECORDS (OUTPATIENT)
Dept: HEALTH INFORMATION MANAGEMENT | Facility: CLINIC | Age: 36
End: 2021-11-01
Payer: COMMERCIAL

## 2021-11-01 NOTE — TELEPHONE ENCOUNTER
SW called patient this date to follow-up regarding 11/1/21 ED encounter for pain due to diverticulitis. Patient reported able to obtain prescribed medications including oxycodone, metronidazole, and ciproflaxacin. Patient reported continued pain at this time. Patient reported having scheduled follow-up with surgeon 11/2/21 and will provide PCP with updates on prospective surgery for diverticulitis. Patient denied need to see PCP at this time.    KHANG SEGURA, KAISERSW, LADC

## 2021-11-03 ENCOUNTER — TELEPHONE (OUTPATIENT)
Dept: FAMILY MEDICINE | Facility: CLINIC | Age: 36
End: 2021-11-03

## 2021-11-03 ENCOUNTER — TRANSFERRED RECORDS (OUTPATIENT)
Dept: HEALTH INFORMATION MANAGEMENT | Facility: CLINIC | Age: 36
End: 2021-11-03
Payer: COMMERCIAL

## 2021-11-03 NOTE — TELEPHONE ENCOUNTER
NATHAN left voicemail for patient this date requesting call back for check-in regarding readmit to ED 11/3/21. Informed patient that updates regarding surgeon visit forwarded to PCP. NATHAN left contact information for patient again in voicemail this date.    KHANG SEGURA, KASSY, [facility]

## 2021-11-05 NOTE — TELEPHONE ENCOUNTER
SW called patient back after missed call received from patient this date. Patient reported having called to schedule  Follow-up appointment in clinic but was able to get through to call center after call to SW. Patient scheduled for 11/15/21 and reported no further concerns for PCP at this time.    KHANG SEGURA, KASSY, [facility]

## 2021-11-07 PROBLEM — K57.32 DIVERTICULITIS OF COLON: Status: ACTIVE | Noted: 2021-11-07

## 2021-11-08 ENCOUNTER — TELEPHONE (OUTPATIENT)
Dept: FAMILY MEDICINE | Facility: CLINIC | Age: 36
End: 2021-11-08
Payer: COMMERCIAL

## 2021-11-08 DIAGNOSIS — R10.32 ABDOMINAL PAIN, LEFT LOWER QUADRANT: ICD-10-CM

## 2021-11-08 NOTE — TELEPHONE ENCOUNTER
I called to check up on the pt and help the pt setup a hospital follow up. The pt was at [facility] for acute diverticulitis. I called the pt, she stated that she was in the hospital and one of room mate was tested positive for COVID. She wanted to come in to get tested for COVID, and she is scheduled with  on 11/15/2021 for her pre-op. Pt stated that she wanted to get tested and see the results first.

## 2021-11-10 RX ORDER — GABAPENTIN 100 MG/1
300 CAPSULE ORAL 3 TIMES DAILY
Qty: 90 CAPSULE | Refills: 0 | Status: SHIPPED | OUTPATIENT
Start: 2021-11-10 | End: 2022-02-28

## 2021-11-15 ENCOUNTER — OFFICE VISIT (OUTPATIENT)
Dept: FAMILY MEDICINE | Facility: CLINIC | Age: 36
End: 2021-11-15
Payer: COMMERCIAL

## 2021-11-15 VITALS
OXYGEN SATURATION: 99 % | HEIGHT: 61 IN | WEIGHT: 176.5 LBS | SYSTOLIC BLOOD PRESSURE: 127 MMHG | TEMPERATURE: 97.9 F | DIASTOLIC BLOOD PRESSURE: 76 MMHG | RESPIRATION RATE: 16 BRPM | HEART RATE: 92 BPM | BODY MASS INDEX: 33.32 KG/M2

## 2021-11-15 DIAGNOSIS — K57.32 DIVERTICULITIS OF COLON: ICD-10-CM

## 2021-11-15 DIAGNOSIS — Z01.818 PREOP GENERAL PHYSICAL EXAM: Primary | ICD-10-CM

## 2021-11-15 DIAGNOSIS — F41.1 GENERALIZED ANXIETY DISORDER: ICD-10-CM

## 2021-11-15 PROCEDURE — 99213 OFFICE O/P EST LOW 20 MIN: CPT | Mod: 25 | Performed by: FAMILY MEDICINE

## 2021-11-15 PROCEDURE — U0003 INFECTIOUS AGENT DETECTION BY NUCLEIC ACID (DNA OR RNA); SEVERE ACUTE RESPIRATORY SYNDROME CORONAVIRUS 2 (SARS-COV-2) (CORONAVIRUS DISEASE [COVID-19]), AMPLIFIED PROBE TECHNIQUE, MAKING USE OF HIGH THROUGHPUT TECHNOLOGIES AS DESCRIBED BY CMS-2020-01-R: HCPCS | Performed by: FAMILY MEDICINE

## 2021-11-15 PROCEDURE — U0005 INFEC AGEN DETEC AMPLI PROBE: HCPCS | Performed by: FAMILY MEDICINE

## 2021-11-15 ASSESSMENT — MIFFLIN-ST. JEOR: SCORE: 1434.59

## 2021-11-15 NOTE — PATIENT INSTRUCTIONS
Preparing for Your Surgery    Follow your surgeon's instructions for bowel preparation, eating/drinking and pre-op antibiotics    Getting started    You can continue you Sertraline (zoloft) without interruption.    A nurse will call you to review your health history and instructions. They will give you an arrival time based on your scheduled surgery time.  Please be ready to share the following:    Your doctor's clinic name and phone number    Your medical, surgical and anesthesia history    A list of allergies and sensitivities    A list of medicines, including herbal treatments and over-the-counter drugs    Whether the patient has a legal guardian (ask how to send us the papers in advance)  If you have a child who's having surgery, please ask for a copy of Preparing for Your Child's Surgery.    Preparing for surgery    Within 30 days of surgery: Have a pre-op exam (sometimes called an H&P, or History and Physical). This can be done at a clinic or pre-operative center.  ? If you're having a , you may not need this exam. Talk to your care team    At your pre-op exam, talk to your care team about all medicines you take. If you need to stop any medicines before surgery, ask when to start taking them again.  ? We do this for your safety. Many medicines can make you bleed too much during surgery. Some change how well surgery (anesthesia) drugs work.    Call your insurance company to let them know you're having surgery. (If you don't have insurance, call [phone].)    Call your clinic if there's any change in your health. This includes signs of a cold or flu (sore throat, runny nose, cough, rash, fever). It also includes a scrape or scratch near the surgery site.    If you have questions on the day of surgery, call your hospital or surgery center.  Eating and drinking guidelines  For your safety: Unless your surgeon tells you otherwise, follow the guidelines below.    Eat and drink as usual until 8 hours  before surgery. After that, no food or milk.    Drink clear liquids until 2 hours before surgery. These are liquids you can see through, like water, Gatorade and Propel Water. You may also have black coffee and tea (no cream or milk).    Nothing by mouth within 2 hours of surgery. This includes gum, candy and breath mints.    If you drink, stop drinking alcohol the night before surgery.    If your care team tells you to take medicine on the morning of surgery, it's okay to take it with a sip of water.  Preventing infection    Shower or bathe the night before and morning of your surgery. Follow the instructions your clinic gave you. (If no instructions, use regular soap.)    Don't shave or clip hair near your surgery site. We'll remove the hair if needed.    Don't smoke or vape the morning of surgery. You may chew nicotine gum up to 2 hours before surgery. A nicotine patch is okay.  ? Note: Some surgeries require you to completely quit smoking and nicotine. Check with your surgeon.    Your care team will make every effort to keep you safe from infection. We will:  ? Clean our hands often with soap and water (or an alcohol-based hand rub).  ? Clean the skin at your surgery site with a special soap that kills germs.  ? Give you a special gown to keep you warm. (Cold raises the risk of infection.)  ? Wear special hair covers, masks, gowns and gloves during surgery.  ? Give antibiotic medicine, if prescribed. Not all surgeries need antibiotics.  What to bring on the day of surgery    Photo ID and insurance card    Copy of your health care directive, if you have one    Glasses and hearing aides (bring cases)  ? You can't wear contacts during surgery    Inhaler and eye drops, if you use them (tell us about these when you arrive)    CPAP machine or breathing device, if you use them    A few personal items, if spending the night    If you have . . .  ? A pacemaker or ICD (cardiac defibrillator): Bring the ID card.  ? An  implanted stimulator: Bring the remote control.  ? A legal guardian: Bring a copy of the certified (court-stamped) guardianship papers.  Please remove any jewelry, including body piercings. Leave jewelry and other valuables at home.  If you're going home the day of surgery  Important: If you don't follow the rules below, we must cancel your surgery.     Arrange for someone to drive you home after surgery. You may not drive, take a taxi or take public transportation by yourself (unless you'll have local anesthesia only).    Arrange for a responsible adult to stay with you overnight. If you don't, we may keep you in the hospital overnight, and you may need to pay the costs yourself.  Questions?   If you have any questions for your care team, list them here: _________________________________________________________________________________________________________________________________________________________________________________________________________________________________________________________________________________________________________________________  For informational purposes only. Not to replace the advice of your health care provider. Copyright   2003, 2019 [facility]. All rights reserved. Clinically reviewed by Angela Zendejas MD. [employer] [id number] - REV 4/20.

## 2021-11-15 NOTE — PROGRESS NOTES
[facility] [address] [place] [address]  Phone: [phone]  Fax: [phone]  Primary Provider: Jesus Cruz  Pre-op Performing Provider: JESUS CRUZ      PREOPERATIVE EVALUATION:  Today's date: 11/15/2021    Venice Gaffney is a 36 year old female who presents for a preoperative evaluation.    Surgical Information:  Surgery/Procedure:  Colectomy, partial, left side  Surgery Location: [facility]  Surgeon: Dr. Alexandra Sheridan  Surgery Date: 11/17/2021  Time of Surgery: @ 12pm  Where patient plans to recover: At home with family  Fax number for surgical facility: [phone]     Type of Anesthesia Anticipated: General    Assessment & Plan     The proposed surgical procedure is considered INTERMEDIATE risk.    Preop general physical exam    -Preprocedure treat Covid screening: Asymptomatic COVID-19 Virus (Coronavirus) by PCR; Future    Diverticulitis of colon  Pain currently well controlled  On Augmentin  Has a preprocedure bowel prep and antibiotic plan outlined by surgery  Surgical approach for partial colectomy may be robotic, laparoscopic, or open depending on surgical findings    Generalized anxiety disorder  Continue sertraline without interruption  She may need additional anxiolytic medication and nonpharmacologic anxiety reducing strategies and interventions in the aung operative inpatient setting  Has tolerated sparing use of Clonazepam 1mg for air travel in the past      Medication Instructions:  Patient can take sertraline on the day of surgery with small sip of water    RECOMMENDATION:  APPROVAL GIVEN to proceed with proposed procedure, without further diagnostic evaluation.    Review of external notes: Reviewed [facility] discharge summary dated 11/07/2021  Reviewed laboratory results from 11/03/2021 at [facility]    56}    Subjective     HPI related to upcoming procedure: 36-year-old female with recurrent diverticulitis with phlegmon since the  end of September 2021.  Her first episode of lifetime diverticulitis was in late September, and she was hospitalized from September 24 until September 27 at [facility].  She was managed with antibiotics and followed by surgery.  She was hospitalized again October 3 to October 11, and most recently from November 3 to November 7.  She has been followed by surgery throughout this course.  Surgical plan is for partial colectomy this Wednesday.    She has been on Augmentin since the time of her hospitalization, and her pain is significantly improved compared to hospital discharge.  She has discontinued oxycodone that she was previously using for pain control.  She denies any fevers or chills.  No nausea or vomiting.  Able to tolerate food and liquids.  Passing stool.  No blood in the stool or black stool.    During her recent hospitalization she was exposed for 3 hours to a hospital roommate with Covid on 11/05/2021.  She has been vaccinated.  She did undergo testing during her hospitalization for Covid which was negative.  She has not had any significant respiratory symptoms.  She has had a scant occasional runny nose.  No fevers or chills.  No cough.  No sore throat.  No loss of taste or smell.  No other known Covid exposures.  She has been isolated due to her diverticulitis illness.    Preop questionnaire    1. No - Have you ever had a heart attack or stroke?  2. No - Have you ever had surgery on your heart or blood vessels, such as a stent, coronary (heart) bypass, or surgery on an artery in the head, neck, heart, or legs?  3. No - Do you have chest pain when you are physically active?  4. No - Do you have a history of heart failure?  5. No - Do you currently have a cold, bronchitis, or symptoms of other respiratory (head and chest) infections?  6. No - Do you have a cough, shortness of breath, or wheezing?  7. No - Do you or anyone in your family have a history of blood clots?  8. No - Do you or anyone in your  family have a serious bleeding problem, such as long-lasting bleeding after surgeries or cuts?  9. No - Have you ever had anemia or been told to take iron pills?  10. No - Have you had any abnormal blood loss such as black, tarry or bloody stools, or abnormal vaginal bleeding?  11. No - Have you ever had a blood transfusion?  12. Yes - Are you willing to have a blood transfusion if it is medically needed before, during, or after your surgery?  13. No - Have you or anyone in your family ever had problems with anesthesia (sedation for surgery)?  14. No - Do you have sleep apnea, excessive snoring, or daytime drowsiness?   15. No - Do you have any artifical heart valves or other implanted medical devices, such as a pacemaker, defibrillator, or continuous glucose monitor?  16. No - Do you have any artifical joints?  17. No - Are you allergic to latex?  18. No - Is there any chance that you may be pregnant?      Health Care Directive:  Patient does not have a Health Care Directive or Living Will    Preoperative Review of :  Oxycodone 5 mg tablets, #30, filled on 11/07/2021  Oxycodone 5 mg tablets #10, filled on 11/01/2021  Oxycodone 5 mg tabs, #6, filled on 10/15/2021  Oxycodone 5 mg tabs, #20 filled on 10/11/2021  Oxycodone 5 mg tabs, #20 filled on 09/27/2021    In addition gabapentin 300 mg 3 times daily on medication list     reviewed - controlled substances prescribed by other outside provider(s).[id number]}        Review of Systems  CONSTITUTIONAL: NEGATIVE for fever, chills  INTEGUMENTARY/SKIN: NEGATIVE for  rashes  EYES: NEGATIVE for vision changes  ENT/MOUTH: Runny nose past week NEGATIVE for other ear, mouth and throat problems  RESP: NEGATIVE for significant cough or SOB  CV: NEGATIVE for chest pain, palpitations or peripheral edema  GI: Left abdominal pain improving. No vomiting. No heartburn  : NEGATIVE for frequency, dysuria, or hematuria  MUSCULOSKELETAL: NEGATIVE for significant arthralgias or  myalgia  NEURO: Tingling sensation right arm, stable to improved  ENDOCRINE: NEGATIVE for temperature intolerance  HEME: NEGATIVE for bleeding problems  PSYCHIATRIC: Increased anxiety due to current illness and multiple hospitalizations    Patient Active Problem List    Diagnosis Date Noted     Diverticulitis of colon 11/07/2021     Priority: Medium     Smoker 10/08/2019     Priority: Medium     Overview:   started age 20, has tried to quit, has cut down to 1/2 ppd       Anxiety with flying 06/10/2019     Priority: Medium     Migraines      Priority: Medium      Past Medical History:   Diagnosis Date     Abnormal Pap smear 2011     Migraines      Past Surgical History:   Procedure Laterality Date     HYSTEROSCOPY, ABLATE ENDOMETRIUM HYDROTHERMAL, COMBINED N/A 11/15/2019    Procedure: HYSTEROSCOPY, DILATION AND CURETTAGE, ENDOMETRIAL ABLATION, REMOVAL OF NEXPLANON;  Surgeon: Trevon Chao MD;  Location: [facility];  Service: Gynecology     NO HISTORY OF SURGERY       MD LAP,TUBAL CAUTERY Bilateral 11/15/2019    Procedure: LAPAROSCOPIC BILATERAL TUBAL LIGATION, HYSTEROSCOPY, DILATION AND CURETTAGE, ENDOMETRIAL ABLATION, REMOVAL OF NEXPLANON;  Surgeon: Trevon Chao MD;  Location: [facility];  Service: Gynecology     TUBAL LIGATION  11/2020     Current Outpatient Medications   Medication Sig Dispense Refill     gabapentin (NEURONTIN) 100 MG capsule Take 3 capsules (300 mg) by mouth 3 times daily 90 capsule 0     hydrOXYzine (VISTARIL) 25 MG capsule Take 2 capsules (50 mg) by mouth every 12 hours as needed for anxiety 60 capsule 1     sertraline (ZOLOFT) 50 MG tablet Take 1 tablet (50 mg) by mouth daily 90 tablet 2       Allergies   Allergen Reactions     Cat Hair Extract      Other reaction(s): Sneezing     Demerol [Meperidine]      Passing out     Imitrex [Sumatriptan]      Fish Allergy Rash        Social History     Tobacco Use     Smoking status: Current Every Day Smoker     Packs/day: 1.00      "Years: 9.00     Pack years: 9.00     Types: Other     Smokeless tobacco: Never Used     Tobacco comment: Vaping   Substance Use Topics     Alcohol use: Yes     Alcohol/week: 0.8 standard drinks     Types: 1 Standard drinks or equivalent per week     Comment: 1-2 times per month     FAMILY HISTORY  No family history of anesthesia reactions  No family history of bleeding or blood clotting disorders    History   Drug Use No         Objective     /76 (BP Location: Right arm, Cuff Size: Adult Regular)   Pulse 92   Temp 97.9  F (36.6  C) (Oral)   Resp 16   Ht 1.56 m (5' 1.42\")   Wt 80.1 kg (176 lb 8 oz)   SpO2 99%   BMI 32.90 kg/m      Physical Exam    GENERAL APPEARANCE:  alert and no distress     EYES: EOMI, PERRL     HENT: ear canals and TM's normal and nose and mouth without ulcers or lesions     NECK: no adenopathy, no asymmetry, masses, or scars and thyroid normal to palpation     RESP: lungs clear to auscultation - no rales, rhonchi or wheezes     CV: regular rates and rhythm, normal S1 S2, no S3 or S4 and no murmur, click or rub     ABDOMEN:  soft, tender left upper and lower.  No peritoneal signs, rebound or guarding  Musculoskeletal: Normal- no gross deformities noted, no evidence of inflammation in joints.  Normal strength and light touch sensation throughout right arm     SKIN: no suspicious lesions or rashes     NEURO: Normal strength and tone, sensory exam grossly normal, mentation intact and speech normal     PSYCH: mentation appears normal. and affect normal/bright      Recent Labs   Lab Test 10/15/21  1203   HGB 14.1         POTASSIUM 4.6   CR 0.86        Diagnostics:    BMP and CBC from 11/3/2021 reviewed  GFR>60  Potassium 3.9    WBC 15.6  Hgb 12.6  Platelets: 210      Revised Cardiac Risk Index (RCRI):  The patient has the following serious cardiovascular risks for perioperative complications:   - No serious cardiac risks = 0 points     RCRI Interpretation: 0 points: Class I " (very low risk - 0.4% complication rate)           Signed Electronically by: Jesus Cruz MD  Copy of this evaluation report is provided to requesting physician.

## 2021-11-16 LAB — SARS-COV-2 RNA RESP QL NAA+PROBE: NEGATIVE

## 2021-12-14 DIAGNOSIS — F41.1 GENERALIZED ANXIETY DISORDER: ICD-10-CM

## 2021-12-14 RX ORDER — HYDROXYZINE PAMOATE 25 MG/1
50 CAPSULE ORAL EVERY 12 HOURS PRN
Qty: 60 CAPSULE | Refills: 0 | Status: SHIPPED | OUTPATIENT
Start: 2021-12-14 | End: 2023-05-25

## 2022-02-28 ENCOUNTER — OFFICE VISIT (OUTPATIENT)
Dept: FAMILY MEDICINE | Facility: CLINIC | Age: 37
End: 2022-02-28
Payer: COMMERCIAL

## 2022-02-28 VITALS
RESPIRATION RATE: 16 BRPM | TEMPERATURE: 98.2 F | SYSTOLIC BLOOD PRESSURE: 124 MMHG | HEIGHT: 61 IN | DIASTOLIC BLOOD PRESSURE: 82 MMHG | BODY MASS INDEX: 33.7 KG/M2 | HEART RATE: 86 BPM | OXYGEN SATURATION: 97 % | WEIGHT: 178.5 LBS

## 2022-02-28 DIAGNOSIS — F41.1 GENERALIZED ANXIETY DISORDER: ICD-10-CM

## 2022-02-28 DIAGNOSIS — L23.81 ALLERGIC CONTACT DERMATITIS DUE TO ANIMAL (CAT) (DOG) DANDER: Primary | ICD-10-CM

## 2022-02-28 PROCEDURE — 99214 OFFICE O/P EST MOD 30 MIN: CPT | Performed by: FAMILY MEDICINE

## 2022-02-28 RX ORDER — SERTRALINE HYDROCHLORIDE 100 MG/1
100 TABLET, FILM COATED ORAL DAILY
Qty: 90 TABLET | Refills: 0 | Status: SHIPPED | OUTPATIENT
Start: 2022-02-28 | End: 2022-05-23

## 2022-02-28 RX ORDER — CETIRIZINE HYDROCHLORIDE 10 MG/1
10 TABLET ORAL DAILY
Qty: 90 TABLET | Refills: 3
Start: 2022-02-28

## 2022-02-28 RX ORDER — FLUTICASONE PROPIONATE 50 MCG
1 SPRAY, SUSPENSION (ML) NASAL DAILY
Qty: 9.9 ML | Refills: 3
Start: 2022-02-28

## 2022-02-28 ASSESSMENT — ANXIETY QUESTIONNAIRES
7. FEELING AFRAID AS IF SOMETHING AWFUL MIGHT HAPPEN: SEVERAL DAYS
3. WORRYING TOO MUCH ABOUT DIFFERENT THINGS: NEARLY EVERY DAY
1. FEELING NERVOUS, ANXIOUS, OR ON EDGE: NEARLY EVERY DAY
IF YOU CHECKED OFF ANY PROBLEMS ON THIS QUESTIONNAIRE, HOW DIFFICULT HAVE THESE PROBLEMS MADE IT FOR YOU TO DO YOUR WORK, TAKE CARE OF THINGS AT HOME, OR GET ALONG WITH OTHER PEOPLE: VERY DIFFICULT
GAD7 TOTAL SCORE: 18
5. BEING SO RESTLESS THAT IT IS HARD TO SIT STILL: MORE THAN HALF THE DAYS
2. NOT BEING ABLE TO STOP OR CONTROL WORRYING: NEARLY EVERY DAY
6. BECOMING EASILY ANNOYED OR IRRITABLE: NEARLY EVERY DAY

## 2022-02-28 ASSESSMENT — PATIENT HEALTH QUESTIONNAIRE - PHQ9
5. POOR APPETITE OR OVEREATING: NEARLY EVERY DAY
SUM OF ALL RESPONSES TO PHQ QUESTIONS 1-9: 18

## 2022-02-28 NOTE — PATIENT INSTRUCTIONS
Reduce your Bendaryl to 25mg twice daily for the next 3 weeks, then reduce to once daily (at nightime) for 2 weeks, then stop altogether    Continue Zyrtec    See an allergist to consider allergy shots      I am supportive of your decision to eliminate social media       For sleep:  Try sleep stories on your CALM elgin  Wait until you feel groggy before getting into bed  Keep your bedroom dark and cool      Increase your zoloft to 100mg daily      See Dr. Cruz in 2 to 3 weeks

## 2022-02-28 NOTE — PROGRESS NOTES
HPI:   Venice Gaffney is a 36 year old  female who presents for:    Chief Complaint   Patient presents with     Anxiety     medication is not working, wants to discuss switching     Allergies     not improving with medicaiton, wants to discuss an alternate therapy     Medication Reconciliation     Generalized anxiety: Feels anxious every day.  Worries about a variety of topics, finances, health, daily decisions.  Often finds it difficult to fall asleep because she is thinking, worrying.  She finds social media triggers her anxiety.  She is elected to disconnect herself from social media.  She is using Zoloft 50 mg daily, initially she felt it was helpful but seems like her anxiety is worse over the past couple of months.  She uses intermittent hydroxyzine 50 mg for acute anxiety, which is at times helpful.  She wishes it was more effective.    Allergies: She has runny nose and congestion in response to dander.  She has tried Claritin, Zyrtec, Allegra and has rotated them.  None of the provided complete relief.  She is also been using Benadry 50 mg 3 times daily for both allergies and anxiety.           PMHX:     Patient Active Problem List   Diagnosis     Migraines     Anxiety with flying     Smoker     Diverticulitis of colon       Current Outpatient Medications   Medication Sig Dispense Refill     cetirizine (ZYRTEC) 10 MG tablet Take 1 tablet (10 mg) by mouth daily 90 tablet 3     fluticasone (FLONASE) 50 MCG/ACT nasal spray Spray 1 spray into both nostrils daily 9.9 mL 3     hydrOXYzine (VISTARIL) 25 MG capsule Take 2 capsules (50 mg) by mouth every 12 hours as needed for anxiety 60 capsule 0     sertraline (ZOLOFT) 100 MG tablet Take 1 tablet (100 mg) by mouth daily 90 tablet 0       Social History     Tobacco Use     Smoking status: Current Every Day Smoker     Packs/day: 1.00     Years: 9.00     Pack years: 9.00     Types: Other     Smokeless tobacco: Never Used     Tobacco comment: Vaping   Vaping  "Use     Vaping Use: Every day   Substance Use Topics     Alcohol use: Yes     Alcohol/week: 0.8 standard drinks     Types: 1 Standard drinks or equivalent per week     Comment: 1-2 times per month     Drug use: No       Social History     Social History Narrative     Not on file       Allergies   Allergen Reactions     Cat Hair Extract      Other reaction(s): Sneezing     Demerol [Meperidine]      Passing out     Imitrex [Sumatriptan]      Fish Allergy Rash              Review of Systems:     General: No fevers.  No significant weight change.  ENT: Runny nose, itchy eyes, congestion in response to dander.  Covid screening questions are negative  Psych: Anxiety as outlined in HPI           Physical Exam:     Vitals:    02/28/22 1105   BP: 124/82   BP Location: Right arm   Cuff Size: Adult Large   Pulse: 86   Resp: 16   Temp: 98.2  F (36.8  C)   TempSrc: Oral   SpO2: 97%   Weight: 81 kg (178 lb 8 oz)   Height: 1.56 m (5' 1.42\")     Body mass index is 33.27 kg/m .    General: Alert, in no acute distress  HEENT: Head is free of trauma.   Sclerae non-icteric.  Moist oral mucus membranes  Resp: Clear to auscultation bilaterally  CV: Regular rate and rhythm  Psych: Mood appropriate       Assessment and Plan   1. Allergic contact dermatitis due to animal (cat) (dog) dander  Recommend weaning from daily Benadryl use, due to the effects on weight, sleep, cognition.  We outlined a wean plan as outlined in the after visit summary.  She will be off Benadryl in 5 weeks    Continue Zyrtec for now    I support going to an allergist to evaluate for allergy shots  - cetirizine (ZYRTEC) 10 MG tablet; Take 1 tablet (10 mg) by mouth daily  Dispense: 90 tablet; Refill: 3  - fluticasone (FLONASE) 50 MCG/ACT nasal spray; Spray 1 spray into both nostrils daily  Dispense: 9.9 mL; Refill: 3  - Adult Allergy/Asthma Referral; Future    2. Generalized anxiety disorder  Discussed the risks and potential benefits of increasing sertraline " dose.  Risks include paradoxical increased anxiety, mood changes, thinking changes, and the rare possibility of developing suicidal thoughts.  She should seek emergency care if she develops any suicidal ideation.  She consented to increasing her sertraline dose.    Increase sertraline from 50 mg to 100 mg daily    Hydroxyzine 25 to 50 mg up to twice daily as needed for severe anxiety    To improve sleep use sleep stories on the calm elgin  Basic sleep hygiene such as waiting till you feel groggy before getting into bed  Keep your bedroom dark and cool  Continue meditative and mindfulness practices    - sertraline (ZOLOFT) 100 MG tablet; Take 1 tablet (100 mg) by mouth daily  Dispense: 90 tablet; Refill: 0      Follow up: 2 to 3 weeks, earlier if you experience any side effects, increase in anxiety, depressive symptoms or new concerns.  Options for treatment and follow-up care were reviewed with the patient and/or guardian. Venice Gaffney and/or guardian engaged in the decision making process and verbalized understanding of the options discussed and agreed with the final plan.    Jesus Cruz MD  Faculty - [facility]

## 2022-03-01 ASSESSMENT — ANXIETY QUESTIONNAIRES: GAD7 TOTAL SCORE: 18

## 2022-03-28 ENCOUNTER — VIRTUAL VISIT (OUTPATIENT)
Dept: FAMILY MEDICINE | Facility: CLINIC | Age: 37
End: 2022-03-28
Payer: COMMERCIAL

## 2022-03-28 DIAGNOSIS — J30.2 SEASONAL ALLERGIES: ICD-10-CM

## 2022-03-28 DIAGNOSIS — F41.1 GENERALIZED ANXIETY DISORDER: Primary | ICD-10-CM

## 2022-03-28 PROCEDURE — 99214 OFFICE O/P EST MOD 30 MIN: CPT | Mod: TEL | Performed by: FAMILY MEDICINE

## 2022-03-28 ASSESSMENT — ANXIETY QUESTIONNAIRES
5. BEING SO RESTLESS THAT IT IS HARD TO SIT STILL: SEVERAL DAYS
2. NOT BEING ABLE TO STOP OR CONTROL WORRYING: MORE THAN HALF THE DAYS
6. BECOMING EASILY ANNOYED OR IRRITABLE: MORE THAN HALF THE DAYS
7. FEELING AFRAID AS IF SOMETHING AWFUL MIGHT HAPPEN: MORE THAN HALF THE DAYS
GAD7 TOTAL SCORE: 14
1. FEELING NERVOUS, ANXIOUS, OR ON EDGE: MORE THAN HALF THE DAYS
3. WORRYING TOO MUCH ABOUT DIFFERENT THINGS: NEARLY EVERY DAY

## 2022-03-28 ASSESSMENT — PATIENT HEALTH QUESTIONNAIRE - PHQ9: 5. POOR APPETITE OR OVEREATING: MORE THAN HALF THE DAYS

## 2022-03-28 NOTE — PROGRESS NOTES
"Family Medicine Telephone Visit Note                   Chief Complaint   Patient presents with     Follow Up     sleep- alleriges are way worse since stopping benadryl. Reports anxiety levels are \"mostly the same\" since stopping benadryl as well. Sleep- not improved                  HPI   Patients name: Venice  Appointment start time:  1:16 PM    Has an allergist appointment on 5/5/22.  Weaned Benadryl over the past 3 weeks. Has been off completely for the past 1 week.  Allergy symptoms very bothersome, runny nose, congestion. A bit worse since stopping Benadryl.   On and off frontal and top of head heachaches. Long history of headaches.    Anxiety: Increased zoloft from 50mg to 100mg one month ago. No change in anxiety level. Signed up for a text service therapy, therapy via text. Free service via her employment benefits. Has been assigned a counselor, but has not responded to the text invitation to start therapy anytime. She is willing to commit to  have her first visit within 14 days.    Still having difficulty falling asleep. Thinking about the day and worries.  Is reading outside the bedroom now. Sleep stories were \"annoying\" and did not help. Has not tried a worry journal.  Sleep has not changed without the benadryl.          Current Outpatient Medications   Medication Sig Dispense Refill     cetirizine (ZYRTEC) 10 MG tablet Take 1 tablet (10 mg) by mouth daily 90 tablet 3     fluticasone (FLONASE) 50 MCG/ACT nasal spray Spray 1 spray into both nostrils daily 9.9 mL 3     hydrOXYzine (VISTARIL) 25 MG capsule Take 2 capsules (50 mg) by mouth every 12 hours as needed for anxiety 60 capsule 0     sertraline (ZOLOFT) 100 MG tablet Take 1 tablet (100 mg) by mouth daily 90 tablet 0     Allergies   Allergen Reactions     Cat Hair Extract      Other reaction(s): Sneezing     Demerol [Meperidine]      Passing out     Imitrex [Sumatriptan]      Fish Allergy Rash              Review of Systems:     No recent " "illness  Allergy symptoms  No new GI symptoms         Physical Exam:     There were no vitals taken for this visit.  Estimated body mass index is 33.27 kg/m  as calculated from the following:    Height as of 2/28/22: 1.56 m (5' 1.42\").    Weight as of 2/28/22: 81 kg (178 lb 8 oz).    Exam:  Constitutional: alert and no distress  Psychiatric: mentation appears normal and affect normal/bright          Assessment and Plan   (F41.1) Generalized anxiety disorder  (primary encounter diagnosis)  Comment:  Plan:  Have first text counseling visit within 14 days  Continue Sertraline 100mg daily  Continue to avoid Benadryl  Try a worry journal to help sleep  Phone check in with Dr. Cruz in 2 weeks to see how counseling went    (J30.2) Seasonal allergies    Plan: Continue non-sedating antihistamine  No Benadryl  See allergist 5/5 as scheduled      Appointment end time: 1:36 PM  This is a telephone visit that took 20 minutes.      Clinician location:  [facility]     Jesus Cruz MD        "

## 2022-03-29 ASSESSMENT — ANXIETY QUESTIONNAIRES: GAD7 TOTAL SCORE: 14

## 2022-05-02 ENCOUNTER — OFFICE VISIT (OUTPATIENT)
Dept: FAMILY MEDICINE | Facility: CLINIC | Age: 37
End: 2022-05-02
Payer: COMMERCIAL

## 2022-05-02 DIAGNOSIS — S76.912A STRAIN OF HIP AND THIGH, LEFT, INITIAL ENCOUNTER: Primary | ICD-10-CM

## 2022-05-02 DIAGNOSIS — S76.012A STRAIN OF HIP AND THIGH, LEFT, INITIAL ENCOUNTER: Primary | ICD-10-CM

## 2022-05-02 PROCEDURE — 99213 OFFICE O/P EST LOW 20 MIN: CPT | Mod: GC | Performed by: STUDENT IN AN ORGANIZED HEALTH CARE EDUCATION/TRAINING PROGRAM

## 2022-05-02 NOTE — PROGRESS NOTES
Assessment and Plan     Left hip pain:  First began about a week ago, when standing up from a pew at [employer].  She had noticed a limp immediately, and the pain was constant, and at times radiating down to the knee medially and laterally.  Is not constant anymore.  Does happen at rest, and with activity sometimes.  Positive Trevon test, global decreased left hip range of motion in comparison to right, due to discomfort.  No trauma to suggest fracture.  No evidence to suggest hernia. Likely just has a muscle strain/spasm.  -Heat/ice/stretching  -Naproxen twice daily, Tylenol as needed  -Continue ambulation, and consider starting yoga  -Follow-up as needed      Options for treatment and follow-up care were reviewed with the patient and/or guardian. Venice Gaffney and/or guardian engaged in the decision making process and verbalized understanding of the options discussed and agreed with the final plan.    Anoop Owens DO, MBA  [facility], PGY-3    Precepted patient with Dr. Jesus Cruz       HPI:   Venice Gaffney is a 36 year old female who presents to clinic today for   Chief Complaint   Patient presents with     Pain     Left knee pain radial to leg x1 week, unsure if she injury her leg     Left Leg pain:  - 1 week ago, stood up from [employer], and limping since.   - started in groin, constant. Now radiating down to knee medial and laterally.   - aleve once per day. Ibuprofen at night help a little maybe?   - Woke up from sleep last night   - intermittent now  - overall improving as not as constant   - spasmy, no tingling          Denies Fever, Chest Pain, shortness of breath , changes in vision/hearing/GI//diet, abdominal pain, numbness/tingling, or any other concerns.         PMHX:   Active Problems List  Patient Active Problem List   Diagnosis     Migraines     Anxiety with flying     Smoker     Diverticulitis of colon       Current  "Medications  Current Outpatient Medications   Medication Sig Dispense Refill     sertraline (ZOLOFT) 100 MG tablet Take 1 tablet (100 mg) by mouth daily 90 tablet 0     cetirizine (ZYRTEC) 10 MG tablet Take 1 tablet (10 mg) by mouth daily 90 tablet 3     fluticasone (FLONASE) 50 MCG/ACT nasal spray Spray 1 spray into both nostrils daily 9.9 mL 3     hydrOXYzine (VISTARIL) 25 MG capsule Take 2 capsules (50 mg) by mouth every 12 hours as needed for anxiety 60 capsule 0       Social History  Social History     Tobacco Use     Smoking status: Current Every Day Smoker     Packs/day: 1.00     Years: 9.00     Pack years: 9.00     Types: Other     Smokeless tobacco: Never Used     Tobacco comment: Vaping   Vaping Use     Vaping Use: Every day   Substance Use Topics     Alcohol use: Yes     Alcohol/week: 0.8 standard drinks     Types: 1 Standard drinks or equivalent per week     Comment: 1-2 times per month     Drug use: No     History   Drug Use No       Family History  Family History   Problem Relation Age of Onset     Diabetes Mother      Eye Disorder Mother         glaucoma     Hypertension Mother      Lipids Mother      Neurologic Disorder Mother         migraines and MS     Alcohol/Drug Father         alcohol      Depression Father        Allergies  Allergies   Allergen Reactions     Cat Hair Extract      Other reaction(s): Sneezing     Demerol [Meperidine]      Passing out     Imitrex [Sumatriptan]      Fish Allergy Rash            Physical Exam:     Vitals:    05/02/22 1448   BP: 121/81   BP Location: Right arm   Patient Position: Sitting   Cuff Size: Adult Regular   Pulse: 87   Resp: 20   Temp: 98.4  F (36.9  C)   TempSrc: Oral   SpO2: (!) 7%   Weight: 83.2 kg (183 lb 6.4 oz)   Height: 1.556 m (5' 1.25\")     Body mass index is 34.37 kg/m .    GENERAL APPEARANCE: alert, appears stated age, no acute distress  HEENT: Eyes grossly normal to inspection  ABDOMEN: soft, nontender   MSK: Global decreased left hip range of " motion, positive Jaramillo sign, negative BRENDA  NEURO: Normal strength and tone, sensory exam grossly normal, mentation appears intact and speech normal  PSYCH: mood and affect normal/bright

## 2022-05-03 ENCOUNTER — MYC MEDICAL ADVICE (OUTPATIENT)
Dept: FAMILY MEDICINE | Facility: CLINIC | Age: 37
End: 2022-05-03
Payer: COMMERCIAL

## 2022-05-03 VITALS
HEART RATE: 87 BPM | TEMPERATURE: 98.4 F | RESPIRATION RATE: 20 BRPM | HEIGHT: 61 IN | WEIGHT: 183.4 LBS | DIASTOLIC BLOOD PRESSURE: 81 MMHG | BODY MASS INDEX: 34.63 KG/M2 | SYSTOLIC BLOOD PRESSURE: 121 MMHG | OXYGEN SATURATION: 97 %

## 2022-05-05 ENCOUNTER — OFFICE VISIT (OUTPATIENT)
Dept: ALLERGY | Facility: CLINIC | Age: 37
End: 2022-05-05
Attending: FAMILY MEDICINE
Payer: COMMERCIAL

## 2022-05-05 VITALS
HEART RATE: 88 BPM | BODY MASS INDEX: 34.72 KG/M2 | WEIGHT: 183.9 LBS | RESPIRATION RATE: 12 BRPM | HEIGHT: 61 IN | OXYGEN SATURATION: 98 %

## 2022-05-05 DIAGNOSIS — L23.81 ALLERGIC CONTACT DERMATITIS DUE TO ANIMAL (CAT) (DOG) DANDER: ICD-10-CM

## 2022-05-05 DIAGNOSIS — J30.81 ALLERGIC RHINITIS DUE TO ANIMALS: Primary | ICD-10-CM

## 2022-05-05 DIAGNOSIS — J30.89 ALLERGIC RHINITIS DUE TO DUST MITE: ICD-10-CM

## 2022-05-05 DIAGNOSIS — J30.1 SEASONAL ALLERGIC RHINITIS DUE TO POLLEN: ICD-10-CM

## 2022-05-05 DIAGNOSIS — T78.1XXA ADVERSE FOOD REACTION, INITIAL ENCOUNTER: ICD-10-CM

## 2022-05-05 PROCEDURE — 95004 PERQ TESTS W/ALRGNC XTRCS: CPT | Performed by: ALLERGY & IMMUNOLOGY

## 2022-05-05 PROCEDURE — 99204 OFFICE O/P NEW MOD 45 MIN: CPT | Mod: 25 | Performed by: ALLERGY & IMMUNOLOGY

## 2022-05-05 RX ORDER — EPINEPHRINE 0.3 MG/.3ML
INJECTION SUBCUTANEOUS
Qty: 4 EACH | Refills: 0 | Status: SHIPPED | OUTPATIENT
Start: 2022-05-05

## 2022-05-05 RX ORDER — AZELASTINE 1 MG/ML
2 SPRAY, METERED NASAL 2 TIMES DAILY
Qty: 30 ML | Refills: 3 | Status: SHIPPED | OUTPATIENT
Start: 2022-05-05

## 2022-05-05 ASSESSMENT — PAIN SCALES - GENERAL: PAINLEVEL: NO PAIN (0)

## 2022-05-05 NOTE — PATIENT INSTRUCTIONS
Dust mite control    Wash bedding weekly, covers, keep humidity <50%    Astelin 2 sprays each nostril twice daily as needed    Flonase 2 sprays each nostril daily     Antihistamine    Consider allergy shots    Shellfish---may be 2/2 to dust mite    Okay to try fish

## 2022-05-05 NOTE — LETTER
"    5/5/2022         RE: Venice Gaffney  [address]  [place] [address]        Dear Colleague,    Thank you for referring your patient, Venice Gaffney, to the [facility]. Please see a copy of my visit note below.          Subjective       HPI     Chief complaint: Allergies    History of present illness: This is a pleasant 36-year-old woman I was asked to see for evaluation by Dr. Cruz in regards to allergies.  Patient states that she has had allergies for many years.  Symptoms occur year-round.  She is especially bad around cats.  Her sister has 2 cats and multiple friends and family have animals as well.  She states the apartment that she lives in and allows animals.  She does not have any animals but feels that she will have symptoms due to this fact.  She states that the older building with poor ventilation.  She has tried several over-the-counter medications and is currently using Zyrtec.  She is also on Flonase.  No history of asthma but she does note when she is around cats sometimes she feels it is difficult to breathe.  She has never been allergy tested.    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  She states when they did this she would develop hives on her arms.  She states this would happen with shrimp as well.  She has avoided all shellfish and fish since that time.  She does not carry an epinephrine device.    Past medical history: anxiety, colonic resection    Social history: As listed in his present illness, she states that she has air conditioning but opens her windows as it is still warm in her apartment but she aggravates her symptoms    Family history: Noncontributory    Review of Systems   Constitutional, HEENT, cardiovascular, pulmonary, GI, , musculoskeletal, neuro, skin, endocrine and psych systems are negative, except as otherwise noted.      Objective    Pulse 88   Resp 12   Ht 1.556 m (5' 1.25\")   Wt 83.4 kg (183 lb 14.4 " oz)   SpO2 98%   BMI 34.46 kg/m    Body mass index is 34.46 kg/m .  Physical Exam      Gen: Pleasant female not in acute distress  HEENT: Eyes no erythema of the bulbar or palpebral conjunctiva, no edema. Ears: TMs well visualized, no effusions. Nose: No congestion, mucosa normal. Mouth: Throat clear, no lip or tongue edema.   Cardiac: Regular rate and rhythm, no murmurs, rubs or gallops  Respiratory: Clear to auscultation bilaterally, no adventitious breath sounds  Lymph: No visible supraclavicular or cervical lymphadenopathy  Skin: No rashes or lesions  Psych: Alert and oriented times 3    43 percutaneous test were placed to the environmental skin test panel, fish and shellfish.  Positive histamine control with a positive test to dust mites, cat and minal trees.  Please see scanned photograph.  Food testing was negative.    Impression report and plan:  1.  Allergic rhinitis  2.  Adverse food reaction    Reviewed environmental control.  Recommended the addition of Astelin nasal spray.  Use 2 sprays each nostril daily of Flonase.  Continue with daily antihistamine.  I did state that Zyrtec is similar to hydroxyzine that she must monitor Zyrtec use while on hydroxyzine.  Would suggest Claritin or Allegra perhaps.  I went over the risks and benefits of allergy shots.  I stated risks include hives, swelling, shortness of breath.  I did state that one in 2.5 million shot administrations can result in death.  I stated they must wait in the office for 30 minutes following the shot and carry an epinephrine device on the day of the shot.  I stated that shots are effective in about 90% of patients.  I stated that they should check with the insurance company prior to proceeding.  They understand the risks and benefits and agreed to proceed.  Consent forms were both signed and scanned into the record.  Epinephrine device was prescribed.  I think it would be okay for her to try fish.  I wonder if her reaction was secondary to  dust mite allergy.  Follow during allergy shots.        Again, thank you for allowing me to participate in the care of your patient.        Sincerely,        Lisseth WEBER MD

## 2022-05-05 NOTE — PROGRESS NOTES
"      Subjective       HPI     Chief complaint: Allergies    History of present illness: This is a pleasant 36-year-old woman I was asked to see for evaluation by Dr. Cruz in regards to allergies.  Patient states that she has had allergies for many years.  Symptoms occur year-round.  She is especially bad around cats.  Her sister has 2 cats and multiple friends and family have animals as well.  She states the apartment that she lives in and allows animals.  She does not have any animals but feels that she will have symptoms due to this fact.  She states that the older building with poor ventilation.  She has tried several over-the-counter medications and is currently using Zyrtec.  She is also on Flonase.  No history of asthma but she does note when she is around cats sometimes she feels it is difficult to breathe.  She has never been allergy tested.    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  She states when they did this she would develop hives on her arms.  She states this would happen with shrimp as well.  She has avoided all shellfish and fish since that time.  She does not carry an epinephrine device.    Past medical history: anxiety, colonic resection    Social history: As listed in his present illness, she states that she has air conditioning but opens her windows as it is still warm in her apartment but she aggravates her symptoms    Family history: Noncontributory    Review of Systems   Constitutional, HEENT, cardiovascular, pulmonary, GI, , musculoskeletal, neuro, skin, endocrine and psych systems are negative, except as otherwise noted.      Objective    Pulse 88   Resp 12   Ht 1.556 m (5' 1.25\")   Wt 83.4 kg (183 lb 14.4 oz)   SpO2 98%   BMI 34.46 kg/m    Body mass index is 34.46 kg/m .  Physical Exam      Gen: Pleasant female not in acute distress  HEENT: Eyes no erythema of the bulbar or palpebral conjunctiva, no edema. Ears: TMs well visualized, no effusions. Nose: No " congestion, mucosa normal. Mouth: Throat clear, no lip or tongue edema.   Cardiac: Regular rate and rhythm, no murmurs, rubs or gallops  Respiratory: Clear to auscultation bilaterally, no adventitious breath sounds  Lymph: No visible supraclavicular or cervical lymphadenopathy  Skin: No rashes or lesions  Psych: Alert and oriented times 3    43 percutaneous test were placed to the environmental skin test panel, fish and shellfish.  Positive histamine control with a positive test to dust mites, cat and minal trees.  Please see scanned photograph.  Food testing was negative.    Impression report and plan:  1.  Allergic rhinitis  2.  Adverse food reaction    Reviewed environmental control.  Recommended the addition of Astelin nasal spray.  Use 2 sprays each nostril daily of Flonase.  Continue with daily antihistamine.  I did state that Zyrtec is similar to hydroxyzine that she must monitor Zyrtec use while on hydroxyzine.  Would suggest Claritin or Allegra perhaps.  I went over the risks and benefits of allergy shots.  I stated risks include hives, swelling, shortness of breath.  I did state that one in 2.5 million shot administrations can result in death.  I stated they must wait in the office for 30 minutes following the shot and carry an epinephrine device on the day of the shot.  I stated that shots are effective in about 90% of patients.  I stated that they should check with the insurance company prior to proceeding.  They understand the risks and benefits and agreed to proceed.  Consent forms were both signed and scanned into the record.  Epinephrine device was prescribed.  I think it would be okay for her to try fish.  I wonder if her reaction was secondary to dust mite allergy.  Follow during allergy shots.

## 2022-05-15 ENCOUNTER — HEALTH MAINTENANCE LETTER (OUTPATIENT)
Age: 37
End: 2022-05-15

## 2022-05-23 DIAGNOSIS — F41.1 GENERALIZED ANXIETY DISORDER: ICD-10-CM

## 2022-05-23 RX ORDER — SERTRALINE HYDROCHLORIDE 100 MG/1
100 TABLET, FILM COATED ORAL DAILY
Qty: 90 TABLET | Refills: 0 | Status: SHIPPED | OUTPATIENT
Start: 2022-05-23 | End: 2022-08-19

## 2022-05-23 NOTE — TELEPHONE ENCOUNTER
Message to physician:     Date of last visit: 5/2/2022    Date of next visit if scheduled:     Potassium   Date Value Ref Range Status   10/15/2021 4.6 3.5 - 5.0 mmol/L Final   08/13/2018 4.4 3.4 - 5.3 mmol/L Final     Creatinine   Date Value Ref Range Status   10/15/2021 0.86 0.60 - 1.10 mg/dL Final   08/13/2018 1.0 0.6 - 1.3 mg/dL Final     GFR Estimate   Date Value Ref Range Status   10/15/2021 87 >60 mL/min/1.73m2 Final     Comment:     As of July 11, 2021, eGFR is calculated by the CKD-EPI creatinine equation, without race adjustment. eGFR can be influenced by muscle mass, exercise, and diet. The reported eGFR is an estimation only and is only applicable if the renal function is stable.       BP Readings from Last 3 Encounters:   05/02/22 121/81   02/28/22 124/82   11/15/21 127/76       No results found for: A1C    Please complete refill and CLOSE ENCOUNTER.  Closing the encounter signifies the refill is complete.

## 2022-06-24 DIAGNOSIS — J30.89 ALLERGIC RHINITIS DUE TO DUST MITE: ICD-10-CM

## 2022-06-24 DIAGNOSIS — J30.1 SEASONAL ALLERGIC RHINITIS DUE TO POLLEN: ICD-10-CM

## 2022-06-24 DIAGNOSIS — L23.81 ALLERGIC CONTACT DERMATITIS DUE TO ANIMAL (CAT) (DOG) DANDER: Primary | ICD-10-CM

## 2022-06-24 PROCEDURE — 95165 ANTIGEN THERAPY SERVICES: CPT | Performed by: ALLERGY & IMMUNOLOGY

## 2022-06-24 NOTE — PROGRESS NOTES
DFM/DPM  1:1000 V/V EXP 8/21/2022  1:100 V/V EXP 10/21/2022  1:10 V/V EXP 6/21/2023  1:1 V/V EXP 6/21/2023    CHECKED BY IB  CHARGED 30 UNITS

## 2022-06-28 ENCOUNTER — TRANSFERRED RECORDS (OUTPATIENT)
Dept: HEALTH INFORMATION MANAGEMENT | Facility: CLINIC | Age: 37
End: 2022-06-28

## 2022-07-07 DIAGNOSIS — J30.81 ALLERGIC RHINITIS DUE TO ANIMALS: ICD-10-CM

## 2022-07-07 DIAGNOSIS — J30.89 ALLERGIC RHINITIS DUE TO DUST MITE: ICD-10-CM

## 2022-07-07 DIAGNOSIS — L23.81 ALLERGIC CONTACT DERMATITIS DUE TO ANIMAL (CAT) (DOG) DANDER: Primary | ICD-10-CM

## 2022-07-07 DIAGNOSIS — J30.1 SEASONAL ALLERGIC RHINITIS DUE TO POLLEN: ICD-10-CM

## 2022-07-07 PROCEDURE — 95165 ANTIGEN THERAPY SERVICES: CPT | Performed by: ALLERGY & IMMUNOLOGY

## 2022-07-07 NOTE — PROGRESS NOTES
TREE/CAT  1:1000 V/V EXP 8/23/2022  1:100 V/V EXP 10/23/2022  1:10 V/V EXP 6/23/2023  1:1 V/V EXP 6/23/2023    CHARGED 30 UNITS   CHECKED BY IB

## 2022-08-19 DIAGNOSIS — F41.1 GENERALIZED ANXIETY DISORDER: ICD-10-CM

## 2022-08-19 RX ORDER — SERTRALINE HYDROCHLORIDE 100 MG/1
100 TABLET, FILM COATED ORAL DAILY
Qty: 90 TABLET | Refills: 1 | Status: SHIPPED | OUTPATIENT
Start: 2022-08-19 | End: 2023-02-20

## 2022-08-29 ENCOUNTER — MYC MEDICAL ADVICE (OUTPATIENT)
Dept: FAMILY MEDICINE | Facility: CLINIC | Age: 37
End: 2022-08-29

## 2022-08-29 DIAGNOSIS — F41.1 GENERALIZED ANXIETY DISORDER: Primary | ICD-10-CM

## 2022-08-29 RX ORDER — SERTRALINE HYDROCHLORIDE 25 MG/1
25 TABLET, FILM COATED ORAL DAILY
Qty: 30 TABLET | Refills: 0 | Status: SHIPPED | OUTPATIENT
Start: 2022-08-29 | End: 2023-05-25

## 2022-08-29 NOTE — TELEPHONE ENCOUNTER
Tingling in forehead and face for past one week - lasts a few seconds, comes and goes 3 or 4 times per hour.  No pain, visual changes or headache.  No facial weakness, twitching or changes she can see in the mirror.  No rash.  Symptoms the same as when she weaned from Effexor years ago and Paxil.    Mood and anxiety stable.  Moved to new apartment and very happy with the new place (still in [facility]).  Work is going well - often working from home, but has in-person option as well.      No new supplements or dietary changes.  No change in Sertraline dose, timing or adherence.    Venice wonders if the medicine is not absorbing as well or body is building a tolerance and she needs a higher dose.    RECOMMENDATION:  Observe for another week.    If develops rash, pain, or other new symptoms immediate re-evaluation.  If symptoms continue, can add 25mg of Sertraline to current 100mg dose for a total of 125mg    Call, virtual, or in person visit in 2 weeks for re-assessment, earlier with changes or new concerns.    Jesus Cruz MD

## 2022-09-10 ENCOUNTER — HEALTH MAINTENANCE LETTER (OUTPATIENT)
Age: 37
End: 2022-09-10

## 2023-02-20 DIAGNOSIS — F41.1 GENERALIZED ANXIETY DISORDER: ICD-10-CM

## 2023-02-20 RX ORDER — SERTRALINE HYDROCHLORIDE 100 MG/1
100 TABLET, FILM COATED ORAL DAILY
Qty: 90 TABLET | Refills: 1 | Status: SHIPPED | OUTPATIENT
Start: 2023-02-20 | End: 2024-01-15

## 2023-05-18 ENCOUNTER — OFFICE VISIT (OUTPATIENT)
Dept: FAMILY MEDICINE | Facility: CLINIC | Age: 38
End: 2023-05-18
Payer: COMMERCIAL

## 2023-05-18 VITALS
BODY MASS INDEX: 36.25 KG/M2 | HEIGHT: 61 IN | OXYGEN SATURATION: 96 % | HEART RATE: 93 BPM | DIASTOLIC BLOOD PRESSURE: 84 MMHG | TEMPERATURE: 98 F | WEIGHT: 192 LBS | SYSTOLIC BLOOD PRESSURE: 125 MMHG

## 2023-05-18 DIAGNOSIS — N90.60 HYPERTROPHY OF LABIA MINORA: Primary | ICD-10-CM

## 2023-05-18 DIAGNOSIS — Z11.59 NEED FOR HEPATITIS C SCREENING TEST: ICD-10-CM

## 2023-05-18 DIAGNOSIS — Z11.4 SCREENING FOR HIV (HUMAN IMMUNODEFICIENCY VIRUS): ICD-10-CM

## 2023-05-18 DIAGNOSIS — Z12.4 CERVICAL CANCER SCREENING: ICD-10-CM

## 2023-05-18 DIAGNOSIS — Z11.3 ROUTINE SCREENING FOR STI (SEXUALLY TRANSMITTED INFECTION): ICD-10-CM

## 2023-05-18 PROCEDURE — 87389 HIV-1 AG W/HIV-1&-2 AB AG IA: CPT | Performed by: STUDENT IN AN ORGANIZED HEALTH CARE EDUCATION/TRAINING PROGRAM

## 2023-05-18 PROCEDURE — 87491 CHLMYD TRACH DNA AMP PROBE: CPT | Performed by: STUDENT IN AN ORGANIZED HEALTH CARE EDUCATION/TRAINING PROGRAM

## 2023-05-18 PROCEDURE — 36415 COLL VENOUS BLD VENIPUNCTURE: CPT | Performed by: STUDENT IN AN ORGANIZED HEALTH CARE EDUCATION/TRAINING PROGRAM

## 2023-05-18 PROCEDURE — 87591 N.GONORRHOEAE DNA AMP PROB: CPT | Performed by: STUDENT IN AN ORGANIZED HEALTH CARE EDUCATION/TRAINING PROGRAM

## 2023-05-18 PROCEDURE — G0145 SCR C/V CYTO,THINLAYER,RESCR: HCPCS | Performed by: STUDENT IN AN ORGANIZED HEALTH CARE EDUCATION/TRAINING PROGRAM

## 2023-05-18 PROCEDURE — 86803 HEPATITIS C AB TEST: CPT | Performed by: STUDENT IN AN ORGANIZED HEALTH CARE EDUCATION/TRAINING PROGRAM

## 2023-05-18 PROCEDURE — 99213 OFFICE O/P EST LOW 20 MIN: CPT | Mod: GC | Performed by: STUDENT IN AN ORGANIZED HEALTH CARE EDUCATION/TRAINING PROGRAM

## 2023-05-18 PROCEDURE — 87624 HPV HI-RISK TYP POOLED RSLT: CPT | Performed by: STUDENT IN AN ORGANIZED HEALTH CARE EDUCATION/TRAINING PROGRAM

## 2023-05-18 NOTE — PROGRESS NOTES
Preceptor Attestation:   Patient seen, evaluated and discussed with the resident. I have verified the content of the note, which accurately reflects my assessment of the patient and the plan of care.    Supervising Physician:Twyla Mendoza MD    [facility]

## 2023-05-18 NOTE — PROGRESS NOTES
"Assessment and Plan     (N90.60) Hypertrophy of labia minora  (primary encounter diagnosis)  Comment: Reporting area of skin that is irritated and painful after sex, on exam noted right labia minora enlarged compared to left, variant of normal asymmetry. Did note small area of irritation on right labia minora, no overlying infection appreciated.   Plan: Provided reassurance, counseled ok to use Aquaphor as needed over area of skin that is irritated    (Z12.4) Cervical cancer screening  Comment: Uncertain when last PAP was, never had abnormal PAP. No records in out system, requesting PAP today so will perform.   Plan: Pap Screen with HPV - recommended age 30 - 65         years    (Z11.3) Routine screening for STI (sexually transmitted infection)  (Z11.4) Screening for HIV (human immunodeficiency virus)  (Z11.59) Need for hepatitis C screening test  Comment: Sexually active with one partner, would like asymptomatic screening.   Plan: HIV Screening, Hepatitis C antibody, Neisseria         Gonorrhoeae PCR, Chlamydia trachomatis PCR       Options for treatment and follow-up care were reviewed with the patient and/or guardian. Venice Gaffney and/or guardian engaged in the decision making process and verbalized understanding of the options discussed and agreed with the final plan.    Ivy Enciso MD      Precepted today with: MD Reji           HPI:       Venice Gaffney is a 38 year old  female with a significant past medical history of tubal ligation and ablation who  presents for the following below:    Pelvic concern   - \"Little part that hangs out of me\"  - Been hurting for the past week or so, tender or sore, rubs against stuff and is bothersome    - She did have sex prior to the pain starting, states it was not well lubricated   - She has been putting aquaphor on the area to help with the rubbing pain  - Uncertain how long the part has been there   - Last PAP a few years ago, had ablation and tubes tide " 3-4 years ago, never had an abnormal   - Has one partner, newer partner and would like STI testing           PMHX:     Patient Active Problem List   Diagnosis     Migraines     Anxiety with flying     Smoker     Diverticulitis of colon       Current Outpatient Medications   Medication Sig Dispense Refill     azelastine (ASTELIN) 0.1 % nasal spray Spray 2 sprays into both nostrils 2 times daily 30 mL 3     cetirizine (ZYRTEC) 10 MG tablet Take 1 tablet (10 mg) by mouth daily 90 tablet 3     EPINEPHrine (ANY BX GENERIC EQUIV) 0.3 MG/0.3ML injection 2-pack Inject into outer thigh for allergic reaction 4 each 0     fluticasone (FLONASE) 50 MCG/ACT nasal spray Spray 1 spray into both nostrils daily 9.9 mL 3     hydrOXYzine (VISTARIL) 25 MG capsule Take 2 capsules (50 mg) by mouth every 12 hours as needed for anxiety 60 capsule 0     ORDER FOR ALLERGEN IMMUNOTHERAPY Vaccine A MOLD/ INDOORALLERGENS/ RAGWEED  DFM Df Mite D farinae 10,000 AU, 0.5 ml  DPM Dp Mite D pteronyssinus. 10,000 AU, 0.5 ml  Vaccine B POLLENS/ CAT  CAT Cat Hair 10,000 BAU 2.0 ml  GABRIELE Gabriele, White Fraxinus Americana 1:20 w/v, 0.5 ml  Dilutions: None (red) Frequency: weekly  1/10 (yellow) Frequency: weekly  1/100 (blue) Frequency: weekly  1/1000 (green) Frequency: weekly      Diluent: HSA qs to 5ml 5 mL 4     sertraline (ZOLOFT) 100 MG tablet Take 1 tablet (100 mg) by mouth daily 90 tablet 1     sertraline (ZOLOFT) 25 MG tablet Take 1 tablet (25 mg) by mouth daily (Patient not taking: Reported on 5/18/2023) 30 tablet 0       Social History     Tobacco Use     Smoking status: Every Day     Packs/day: 1.00     Years: 9.00     Pack years: 9.00     Types: Other, Cigarettes     Smokeless tobacco: Never     Tobacco comments:     Vaping   Vaping Use     Vaping status: Every Day   Substance Use Topics     Alcohol use: Yes     Alcohol/week: 0.8 standard drinks of alcohol     Types: 1 Standard drinks or equivalent per week     Comment: 1-2 times per month     Drug  "use: No          Allergies   Allergen Reactions     Cat Hair Extract      Other reaction(s): Sneezing     Demerol [Meperidine]      Passing out     Imitrex [Sumatriptan]        No results found for this or any previous visit (from the past 24 hour(s)).         Review of Systems:     10 point ROS negative except for what is noted in HPI          Physical Exam:     Vitals:    05/18/23 1051   BP: 125/84   Pulse: 93   Temp: 98  F (36.7  C)   TempSrc: Oral   SpO2: 96%   Weight: 87.1 kg (192 lb)   Height: 1.552 m (5' 1.1\")     Body mass index is 36.16 kg/m .      GENERAL APPEARANCE: healthy, alert and no distress,  GU_female: Right labia minora enlarged with some very mild erythema appreciated, right labia minora bigger than left, seems normal variant, normal appearing cervix  MS: extremities normal- no gross deformities noted  SKIN: no suspicious lesions or rashes  PSYCH: mood and affect normal/bright    "

## 2023-05-19 LAB
C TRACH DNA SPEC QL NAA+PROBE: NEGATIVE
HCV AB SERPL QL IA: NONREACTIVE
HIV 1+2 AB+HIV1 P24 AG SERPL QL IA: NONREACTIVE
N GONORRHOEA DNA SPEC QL NAA+PROBE: NEGATIVE

## 2023-05-23 LAB
BKR LAB AP GYN ADEQUACY: NORMAL
BKR LAB AP GYN INTERPRETATION: NORMAL
BKR LAB AP HPV REFLEX: NORMAL
BKR LAB AP PREVIOUS ABNORMAL: NORMAL
PATH REPORT.COMMENTS IMP SPEC: NORMAL
PATH REPORT.COMMENTS IMP SPEC: NORMAL
PATH REPORT.RELEVANT HX SPEC: NORMAL

## 2023-05-24 LAB
HUMAN PAPILLOMA VIRUS 16 DNA: NEGATIVE
HUMAN PAPILLOMA VIRUS 18 DNA: NEGATIVE
HUMAN PAPILLOMA VIRUS FINAL DIAGNOSIS: NORMAL
HUMAN PAPILLOMA VIRUS OTHER HR: NEGATIVE

## 2023-05-25 ENCOUNTER — OFFICE VISIT (OUTPATIENT)
Dept: FAMILY MEDICINE | Facility: CLINIC | Age: 38
End: 2023-05-25
Payer: COMMERCIAL

## 2023-05-25 VITALS
BODY MASS INDEX: 35.7 KG/M2 | HEIGHT: 62 IN | WEIGHT: 194 LBS | RESPIRATION RATE: 20 BRPM | SYSTOLIC BLOOD PRESSURE: 125 MMHG | DIASTOLIC BLOOD PRESSURE: 84 MMHG

## 2023-05-25 DIAGNOSIS — E66.811 CLASS 1 OBESITY WITHOUT SERIOUS COMORBIDITY WITH BODY MASS INDEX (BMI) OF 34.0 TO 34.9 IN ADULT, UNSPECIFIED OBESITY TYPE: ICD-10-CM

## 2023-05-25 DIAGNOSIS — G47.33 OBSTRUCTIVE SLEEP APNEA SYNDROME: Primary | ICD-10-CM

## 2023-05-25 DIAGNOSIS — F41.1 GENERALIZED ANXIETY DISORDER: ICD-10-CM

## 2023-05-25 LAB — TSH SERPL DL<=0.005 MIU/L-ACNC: 2.17 UIU/ML (ref 0.3–4.2)

## 2023-05-25 PROCEDURE — 84443 ASSAY THYROID STIM HORMONE: CPT | Performed by: FAMILY MEDICINE

## 2023-05-25 PROCEDURE — 36415 COLL VENOUS BLD VENIPUNCTURE: CPT | Performed by: FAMILY MEDICINE

## 2023-05-25 PROCEDURE — 99214 OFFICE O/P EST MOD 30 MIN: CPT | Performed by: FAMILY MEDICINE

## 2023-05-25 NOTE — PROGRESS NOTES
"       HPI:   Venice Gaffney is a 38 year old  female who presents for:    Chief Complaint   Patient presents with     Recheck Medication     Medication questions      Sleep apnea      Lifelong snoring   \"So loud they can hear me 3 floors away\"  Mother and significant other have commented on snoring  Tired during the day  Difficulty falling asleep most times \"thinking too much\"  No difficulty with driving, or falling asleep while driving.  Never had sleep study      Anxiety:  Sertraline 100mg daily for past year or so  Concerned that Sertraline is keeping from losing weight despite good exercise and diet changes.  Is interested in weaning from sertraline.  Feels anxiety is very well controlled.  She has learned multiple coping mechanisms, and relaxation techniques.  Still has some difficulty falling asleep due to \"thinking too much\".             PMHX:     Patient Active Problem List   Diagnosis     Migraines     Anxiety with flying     Smoker     Diverticulitis of colon     Hypertrophy of labia minora       Current Outpatient Medications   Medication Sig Dispense Refill     azelastine (ASTELIN) 0.1 % nasal spray Spray 2 sprays into both nostrils 2 times daily 30 mL 3     cetirizine (ZYRTEC) 10 MG tablet Take 1 tablet (10 mg) by mouth daily 90 tablet 3     EPINEPHrine (ANY BX GENERIC EQUIV) 0.3 MG/0.3ML injection 2-pack Inject into outer thigh for allergic reaction 4 each 0     fluticasone (FLONASE) 50 MCG/ACT nasal spray Spray 1 spray into both nostrils daily 9.9 mL 3     ORDER FOR ALLERGEN IMMUNOTHERAPY Vaccine A MOLD/ INDOORALLERGENS/ RAGWEED  DFM Df Mite D farinae 10,000 AU, 0.5 ml  DPM Dp Mite D pteronyssinus. 10,000 AU, 0.5 ml  Vaccine B POLLENS/ CAT  CAT Cat Hair 10,000 BAU 2.0 ml  GABRIELE Gabriele, White Fraxinus Americana 1:20 w/v, 0.5 ml  Dilutions: None (red) Frequency: weekly  1/10 (yellow) Frequency: weekly  1/100 (blue) Frequency: weekly  1/1000 (green) Frequency: weekly      Diluent: HSA qs to 5ml 5 mL 4     " "sertraline (ZOLOFT) 100 MG tablet Take 1 tablet (100 mg) by mouth daily 90 tablet 1       Social History     Tobacco Use     Smoking status: Every Day     Packs/day: 1.00     Years: 9.00     Pack years: 9.00     Types: Other, Cigarettes     Smokeless tobacco: Never     Tobacco comments:     Vaping   Vaping Use     Vaping Use: Every day   Substance Use Topics     Alcohol use: Yes     Alcohol/week: 0.8 standard drinks of alcohol     Types: 1 Standard drinks or equivalent per week     Comment: 1-2 times per month     Drug use: No       Social History     Social History Narrative     Not on file       Allergies   Allergen Reactions     Sumatriptan Anaphylaxis and Other (See Comments)     Passing out  Passing out       Cat Hair Extract Other (See Comments)     Other reaction(s): Sneezing  Watery eyes, sneezing, itching  Watery eyes, sneezing, itching       Demerol [Meperidine]      Passing out     Meperidine And Related Other (See Comments)     syncope  Passed out  Passing out  Passing out       Seasonal Allergies Headache and Other (See Comments)     Other reaction(s): Sneezing     Fish Allergy Rash and Hives     Rash  Rash       Seafood Rash and Hives     Shellfish Allergy Hives and Rash       No results found for this or any previous visit (from the past 24 hour(s)).         Review of Systems:     General: Weight is stable  No fevers  ENT: No upper respiratory symptoms  Cardiovascular: No chest pain or palpitations.  No new leg swelling  Respiratory: No cough or shortness of breath  GI: No changes           Physical Exam:     Vitals:    05/25/23 0949   BP: 125/84   Resp: 20   Weight: 88 kg (194 lb)   Height: 1.57 m (5' 1.81\")     Body mass index is 35.7 kg/m .    General: Alert, in no acute distress  HEENT: Head is free of trauma.  No adenopathy.  Sclerae non-icteric. PERRL, Moist oral mucus membranes  Resp: Clear to auscultation bilaterally  CV: Regular rate and rhythm  Abd: Soft, non-tender.  Ext: Warm.    Skin: " exposed skin free of rash  Psych: Mood appropriate     Results for orders placed or performed in visit on 05/25/23   TSH with free T4 reflex     Status: Normal   Result Value Ref Range    TSH 2.17 0.30 - 4.20 uIU/mL         Assessment and Plan     Reduce your sertraline dose from 100mg to 50mg each day for the next 14 days  Then reduce the dose to 25mg daily for 10 days, then stop    If you experience any new bothersome symptoms during your wean, particularly as you stop altogether, feel free to call for advice and a prescription for 25mg tablets to cut    Schedule your sleep study    See Dr. Cruz for your annual exam, earlier as needed    1. Obstructive sleep apnea syndrome  Symptoms are consistent with obstructive sleep apnea  We will evaluate with TSH, and a sleep study    - TSH with free T4 reflex; Future  - Sleep Study Referral; Future  - TSH with free T4 reflex    2. Generalized anxiety disorder  Reasonable to try to wean from sertraline.  We will reduce her dose from 100 mg down to 50 mg each day for the next 14 days, then reduce to 25 mg daily for 10 days, then stop  She will call if she experiences any withdrawal symptoms which were reviewed today in detail  Continue adjunct treatments for anxiety, she is well educated and well versed in nonpharmacologic treatments for anxiety    3. Class 1 obesity without serious comorbidity with body mass index (BMI) of 34.0 to 34.9 in adult, unspecified obesity type  Evaluate TSH today, which returned normal  Reviewed dietary and exercise recommendations, will wean from sertraline as outlined above  - TSH with free T4 reflex; Future  - TSH with free T4 reflex      Follow up: Schedule annual preventative exam for sometime in the next couple months  Options for treatment and follow-up care were reviewed with the patient and/or guardian. Venice Gaffney and/or guardian engaged in the decision making process and verbalized understanding of the options discussed and agreed  with the final plan.    Jesus Cruz MD  Faculty - [facility]

## 2023-05-25 NOTE — PATIENT INSTRUCTIONS
Reduce your sertraline dose from 100mg to 50mg each day for the next 14 days  Then reduce the dose to 25mg daily for 10 days, then stop    If you experience any new bothersome symptoms during your wean, particularly as you stop altogether, feel free to call for advice and a prescription for 25mg tablets to cut    Schedule your sleep study    See Dr. Cruz for your annual exam, earlier as needed

## 2023-06-03 ENCOUNTER — HEALTH MAINTENANCE LETTER (OUTPATIENT)
Age: 38
End: 2023-06-03

## 2023-07-19 ENCOUNTER — PATIENT OUTREACH (OUTPATIENT)
Dept: CARE COORDINATION | Facility: CLINIC | Age: 38
End: 2023-07-19
Payer: COMMERCIAL

## 2023-07-19 NOTE — PROGRESS NOTES
Clinic Care Coordination Contact    Follow Up Progress Note      Assessment: The pt was recently in the ED, I called to check up on the pt and help the pt setup a ED follow up. I called the pt, but got her vm, so I left a vm for the pt to give me a call back.     Care Gaps:    Health Maintenance Due   Topic Date Due     ADVANCE CARE PLANNING  Never done     HEPATITIS B IMMUNIZATION (1 of 3 - 3-dose series) Never done     Pneumococcal Vaccine: Pediatrics (0 to 5 Years) and At-Risk Patients (6 to 64 Years) (1 - PCV) Never done     YEARLY PREVENTIVE VISIT  03/01/2002     DTAP/TDAP/TD IMMUNIZATION (4 - Td or Tdap) 09/11/2021     COVID-19 Vaccine (3 - Booster for [place] series) 02/15/2022     NICOTINE/TOBACCO CESSATION COUNSELING Q 1 YR  10/15/2022           Care Plans      Intervention/Education provided during outreach:               Plan:     Care Coordinator will follow up in

## 2023-07-19 NOTE — PROGRESS NOTES
I got a call back from the pt, pt stated that she is doing better. She stated that she was given some medication, and wanted to wait and see how it works. If she feels that she needs a follow up, she will call the clinic.

## 2023-09-21 ENCOUNTER — OFFICE VISIT (OUTPATIENT)
Dept: SLEEP MEDICINE | Facility: CLINIC | Age: 38
End: 2023-09-21
Attending: FAMILY MEDICINE
Payer: COMMERCIAL

## 2023-09-21 VITALS
BODY MASS INDEX: 36.25 KG/M2 | DIASTOLIC BLOOD PRESSURE: 82 MMHG | HEIGHT: 61 IN | HEART RATE: 99 BPM | SYSTOLIC BLOOD PRESSURE: 135 MMHG | OXYGEN SATURATION: 96 % | WEIGHT: 192 LBS | RESPIRATION RATE: 18 BRPM

## 2023-09-21 DIAGNOSIS — G47.00 INSOMNIA, UNSPECIFIED TYPE: ICD-10-CM

## 2023-09-21 DIAGNOSIS — R29.818 SUSPECTED SLEEP APNEA: Primary | ICD-10-CM

## 2023-09-21 DIAGNOSIS — R06.81 WITNESSED APNEIC SPELLS: ICD-10-CM

## 2023-09-21 DIAGNOSIS — G47.19 EXCESSIVE DAYTIME SLEEPINESS: ICD-10-CM

## 2023-09-21 DIAGNOSIS — R06.83 LOUD SNORING: ICD-10-CM

## 2023-09-21 DIAGNOSIS — E66.9 OBESITY (BMI 30-39.9): ICD-10-CM

## 2023-09-21 PROCEDURE — 99203 OFFICE O/P NEW LOW 30 MIN: CPT | Performed by: NURSE PRACTITIONER

## 2023-09-21 ASSESSMENT — SLEEP AND FATIGUE QUESTIONNAIRES
HOW LIKELY ARE YOU TO NOD OFF OR FALL ASLEEP WHILE SITTING QUIETLY AFTER LUNCH WITHOUT ALCOHOL: MODERATE CHANCE OF DOZING
HOW LIKELY ARE YOU TO NOD OFF OR FALL ASLEEP WHILE SITTING AND TALKING TO SOMEONE: WOULD NEVER DOZE
HOW LIKELY ARE YOU TO NOD OFF OR FALL ASLEEP IN A CAR, WHILE STOPPED FOR A FEW MINUTES IN TRAFFIC: WOULD NEVER DOZE
HOW LIKELY ARE YOU TO NOD OFF OR FALL ASLEEP WHILE SITTING INACTIVE IN A PUBLIC PLACE: WOULD NEVER DOZE
HOW LIKELY ARE YOU TO NOD OFF OR FALL ASLEEP WHILE LYING DOWN TO REST IN THE AFTERNOON WHEN CIRCUMSTANCES PERMIT: HIGH CHANCE OF DOZING
HOW LIKELY ARE YOU TO NOD OFF OR FALL ASLEEP WHEN YOU ARE A PASSENGER IN A CAR FOR AN HOUR WITHOUT A BREAK: WOULD NEVER DOZE
HOW LIKELY ARE YOU TO NOD OFF OR FALL ASLEEP WHILE WATCHING TV: HIGH CHANCE OF DOZING
HOW LIKELY ARE YOU TO NOD OFF OR FALL ASLEEP WHILE SITTING AND READING: HIGH CHANCE OF DOZING

## 2023-09-21 NOTE — PATIENT INSTRUCTIONS
"          MY TREATMENT INFORMATION FOR SLEEP APNEA-  Venice Gaffney    DOCTOR : MARY Sam CNP    Am I having a sleep study at a sleep center?  --->Due to normal delays, you will be contacted within 2-4 weeks to schedule    Am I having a home sleep study?  --->Watch the video for the device you are using:    -/drop off device-   https://www.TermSync.com/watch?v=yGGFBdELGhk    -Disposable device sent out require phone/computer application-   https://www.Ymagisube.com/watch?v=BCce_vbiwxE      Frequently asked questions:  1. What is Obstructive Sleep Apnea (NADEEM)? NADEEM is the most common type of sleep apnea. Apnea means, \"without breath.\"  Apnea is most often caused by narrowing or collapse of the upper airway as muscles relax during sleep.   Almost everyone has occasional apneas. Most people with sleep apnea have had brief interruptions at night frequently for many years.  The severity of sleep apnea is related to how frequent and severe the events are.   2. What are the consequences of NADEEM? Symptoms include: feeling sleepy during the day, snoring loudly, gasping or stopping of breathing, trouble sleeping, and occasionally morning headaches or heartburn at night.  Sleepiness can be serious and even increase the risk of falling asleep while driving. Other health consequences may include development of high blood pressure and other cardiovascular disease in persons who are susceptible. Untreated NADEEM  can contribute to heart disease, stroke and diabetes.   3. What are the treatment options? In most situations, sleep apnea is a lifelong disease that must be managed with daily therapy. Medications are not effective for sleep apnea and surgery is generally not considered until other therapies have been tried. Your treatment is your choice . Continuous Positive Airway (CPAP) works right away and is the therapy that is effective in nearly everyone. An oral device to hold your jaw forward is usually the next most " reliable option. Other options include postioning devices (to keep you off your back), weight loss, and surgery including a tongue pacing device. There is more detail about some of these options below.  4. Are my sleep studies covered by insurance? Although we will request verification of coverage, we advise you also check in advance of the study to ensure there is coverage.    Important tips for those choosing CPAP and similar devices  For new devices, sign up for device ELGIN to monitor your device for your followup visits  We encourage you to utilize the [employer] elgin or website (myAir web (resmed.com) ) to monitor your therapy progress and share the data with your healthcare team when you discuss your sleep apnea.                                                    Know your equipment:  CPAP is continuous positive airway pressure that prevents obstructive sleep apnea by keeping the throat from collapsing while you are sleeping. In most cases, the device is  smart  and can slowly self-adjusts if your throat collapses and keeps a record every day of how well you are treated-this information is available to you and your care team.  BPAP is bilevel positive airway pressure that keeps your throat open and also assists each breath with a pressure boost to maintain adequate breathing.  Special kinds of BPAP are used in patients who have inadequate breathing from lung or heart disease. In most cases, the device is  smart  and can slowly self-adjusts to assist breathing. Like CPAP, the device keeps a record of how well you are treated.  Your mask is your connection to the device. You get to choose what feels most comfortable and the staff will help to make sure if fits. Here: are some examples of the different masks that are available:       Key points to remember on your journey with sleep apnea:  Sleep study.  PAP devices often need to be adjusted during a sleep study to show that they are effective and adjusted  right.  Good tips to remember: Try wearing just the mask during a quiet time during the day so your body adapts to wearing it. A humidifier is recommended for comfort in most cases to prevent drying of your nose and throat. Allergy medication from your provider may help you if you are having nasal congestion.  Getting settled-in. It takes more than one night for most of us to get used to wearing a mask. Try wearing just the mask during a quiet time during the day so your body adapts to wearing it. A humidifier is recommended for comfort in most cases. Our team will work with you carefully on the first day and will be in contact within 4 days and again at 2 and 4 weeks for advice and remote device adjustments. Your therapy is evaluated by the device each day.   Use it every night. The more you are able to sleep naturally for 7-8 hours, the more likely you will have good sleep and to prevent health risks or symptoms from sleep apnea. Even if you use it 4 hours it helps. Occasionally all of us are unable to use a medical therapy, in sleep apnea, it is not dangerous to miss one night.   Communicate. Call our skilled team on the number provided on the first day if your visit for problems that make it difficult to wear the device. Over 2 out of 3 patients can learn to wear the device long-term with help from our team. Remember to call our team or your sleep providers if you are unable to wear the device as we may have other solutions for those who cannot adapt to mask CPAP therapy. It is recommended that you sleep your sleep provider within the first 3 months and yearly after that if you are not having problems.   Use it for your health. We encourage use of CPAP masks during daytime quiet periods to allow your face and brain to adapt to the sensation of CPAP so that it will be a more natural sensation to awaken to at night or during naps. This can be very useful during the first few weeks or months of adapting to CPAP  though it does not help medically to wear CPAP during wakefulness and  should not be used as a strategy just to meet guidelines.  Take care of your equipment. Make sure you clean your mask and tubing using directions every day and that your filter and mask are replaced as recommended or if they are not working.     BESIDES CPAP, WHAT OTHER THERAPIES ARE THERE?    Positioning Device  Positioning devices are generally used when sleep apnea is mild and only occurs on your back.This example shows a pillow that straps around the waist. It may be appropriate for those whose sleep study shows milder sleep apnea that occurs primarily when lying flat on one's back. Preliminary studies have shown benefit but effectiveness at home may need to be verified by a home sleep test. These devices are generally not covered by medical insurance.  Examples of devices that maintain sleeping on the back to prevent snoring and mild sleep apnea.    Belt type body positioner  http://Workec/    Electronic reminder  http://nightshifttherapy.Algotochip/            Oral Appliance  What is oral appliance therapy?  An oral appliance device fits on your teeth at night like a retainer used after having braces. The device is made by a specialized dentist and requires several visits over 1-2 months before a manufactured device is made to fit your teeth and is adjusted to prevent your sleep apnea. Once an oral device is working properly, snoring should be improved. A home sleep test may be recommended at that time if to determine whether the sleep apnea is adequately treated.       Some things to remember:  -Oral devices are often, but not always, covered by your medical insurance. Be sure to check with your insurance provider.   -If you are referred for oral therapy, you will be given a list of specialized dentists to consider or you may choose to visit the Web site of the American Academy of Dental Sleep Medicine  -Oral devices are less likely to work  if you have severe sleep apnea or are extremely overweight.     More detailed information  An oral appliance is a small acrylic device that fits over the upper and lower teeth  (similar to a retainer or a mouth guard). This device slightly moves jaw forward, which moves the base of the tongue forward, opens the airway, improves breathing for effective treat snoring and obstructive sleep apnea in perhaps 7 out of 10 people .  The best working devices are custom-made by a dental device  after a mold is made of the teeth 1, 2, 3.  When is an oral appliance indicated?  Oral appliance therapy is recommended as a first-line treatment for patients with primary snoring, mild sleep apnea, and for patients with moderate sleep apnea who prefer appliance therapy to use of CPAP4, 5. Severity of sleep apnea is determined by sleep testing and is based on the number of respiratory events per hour of sleep.   How successful is oral appliance therapy?  The success rate of oral appliance therapy in patients with mild sleep apnea is 75-80% while in patients with moderate sleep apnea it is 50-70%. The chance of success in patients with severe sleep apnea is 40-50%. The research also shows that oral appliances have a beneficial effect on the cardiovascular health of NADEEM patients at the same magnitude as CPAP therapy7.  Oral appliances should be a second-line treatment in cases of severe sleep apnea, but if not completely successful then a combination therapy utilizing CPAP plus oral appliance therapy may be effective. Oral appliances tend to be effective in a broad range of patients although studies show that the patients who have the highest success are females, younger patients, those with milder disease, and less severe obesity. 3, 6.   Finding a dentist that practices dental sleep medicine  Specific training is available through the American Academy of Dental Sleep Medicine for dentists interested in working in the field  of sleep. To find a dentist who is educated in the field of sleep and the use of oral appliances, near you, visit the Web site of the American Academy of Dental Sleep Medicine.    References  1. Taqueria et al. Objectively measured vs self-reported compliance during oral appliance therapy for sleep-disordered breathing. Chest 2013; 144(5): 3453-2474.  2. Abel, et al. Objective measurement of compliance during oral appliance therapy for sleep-disordered breathing. Thorax 2013; 68(1): 91-96.  3. Willam et al. Mandibular advancement devices in 620 men and women with NADEEM and snoring: tolerability and predictors of treatment success. Chest 2004; 125: 9759-1964.  4. Dinorah et al. Oral appliances for snoring and NADEEM: a review. Sleep 2006; 29: 244-262.  5. Alexandro et al. Oral appliance treatment for NADEEM: an update. J Clin Sleep Med 2014; 10(2): 215-227.  6. Nannette et al. Predictors of OSAH treatment outcome. J Dent Res 2007; 86: 2453-5851.      Weight Loss:    Weight loss is a long-term strategy that may improve sleep apnea in some patients.    Weight management is a personal decision and the decision should be based on your interest and the potential benefits.  If you are interested in exploring weight loss strategies, the following discussion covers the impact on weight loss on sleep apnea and the approaches that may be successful.    Being overweight does not necessarily mean you will have health consequences.  Those who have BMI over 35 or over 27 with existing medical conditions carries greater risk.   Weight loss decreases severity of sleep apnea in most people with obesity. For those with mild obesity who have developed snoring with weight gain, even 15-30 pound weight loss can improve and occasionally eliminate sleep apnea.  Structured and life-long dietary and health habits are necessary to lose weight and keep healthier weight levels.     Though there may be significant health benefits from  weight loss, long-term weight loss is very difficult to achieve- studies show success with dietary management in less than 10% of people. In addition, substantial weight loss may require years of dietary control and may be difficult if patients have severe obesity. In these cases, surgical management may be considered.  Finally, older individuals who have tolerated obesity without health complications may be less likely to benefit from weight loss strategies.      Your BMI is Body mass index is 36.28 kg/m .    What is BMI?  Body mass index (BMI) is one way to tell whether you are at a healthy weight, overweight, or obese. It measures your weight in relation to your height.  A BMI of 18.5 to 24.9 is in the healthy range. A person with a BMI of 25 to 29.9 is considered overweight, and someone with a BMI of 30 or greater is considered obese.  Another way to find out if you are at risk for health problems caused by overweight and obesity is to measure your waist. If you are a woman and your waist is more than 35 inches, or if you are a man and your waist is more than 40 inches, your risk of disease may be higher.  More than two-thirds of American adults are considered overweight or obese. Being overweight or obese increases the risk for further weight gain.  Excess weight may lead to heart disease and diabetes. Creating and following plans for healthy eating and physical activity may help you improve your health.    Methods for maintaining or losing weight.  Weight control is part of healthy lifestyle and includes exercise, emotional health, and healthy eating habits.  Careful eating habits lifelong is the mainstay of weight control.  Though there are significant health benefits from weight loss, long-term weight loss with diet alone may be very difficult to achieve- studies show long-term success with dietary management in less than 10% of people. Attaining a healthy weight may be especially difficult to achieve in  those with severe obesity. In some cases, medications, devices and surgical management might be considered.    What can you do?  If you are overweight or obese and are interested in methods for weight loss, you should discuss this with your provider. In addition, we recommend that you review healthy life styles and methods for weight loss available through the National Institutes of Health patient information sites:   http://win.niddk.nih.gov/publications/index.htm    Surgery:    Surgery for obstructive sleep apnea is considered generally only when other therapies fail to work. Surgery may be discussed with you if you are having a difficult time tolerating CPAP and or when there is an abnormal structure that requires surgical correction.  Nose and throat surgeries often enlarge the airway to prevent collapse.  Most of these surgeries create pain for 1-2 weeks and up to half of the most common surgeries are not effective throughout life.  You should carefully discuss the benefits and drawbacks to surgery with your sleep provider and surgeon to determine if it is the best solution for you.   More information  Surgery for NADEEM is directed at areas that are responsible for narrowing or complete obstruction of the airway during sleep.  There are a wide range of procedures available to enlarge and/or stabilize the airway to prevent blockage of breathing in the three major areas where it can occur: the palate, tongue, and nasal regions.  Successful surgical treatment depends on the accurate identification of the factors responsible for obstructive sleep apnea in each person.  A personalized approach is required because there is no single treatment that works well for everyone.  Because of anatomic variation, consultation with an examination by a sleep surgeon is a critical first step in determining what surgical options are best for each patient.  In some cases, examination during sedation may be recommended in order to  guide the selection of procedures.  Patients will be counseled about risks and benefits as well as the typical recovery course after surgery. Surgery is typically not a cure for a person s NADEEM.  However, surgery will often significantly improve one s NADEEM severity (termed  success rate ).  Even in the absence of a cure, surgery will decrease the cardiovascular risk associated with OSA7; improve overall quality of life8 (sleepiness, functionality, sleep quality, etc).      Palate Procedures:  Patients with NADEEM often have narrowing of their airway in the region of their tonsils and uvula.  The goals of palate procedures are to widen the airway in this region as well as to help the tissues resist collapse.  Modern palate procedure techniques focus on tissue conservation and soft tissue rearrangement, rather than tissue removal.  Often the uvula is preserved in this procedure. Residual sleep apnea is common in patient after pharyngoplasty with an average reduction in sleep apnea events of 33%2.      Tongue Procedures:  ExamWhile patients are awake, the muscles that surround the throat are active and keep this region open for breathing. These muscles relax during sleep, allowing the tongue and other structures to collapse and block breathing.  There are several different tongue procedures available.  Selection of a tongue base procedure depends on characteristics seen on physical exam.  Generally, procedures are aimed at removing bulky tissues in this area or preventing the back of the tongue from falling back during sleep.  Success rates for tongue surgery range from 50-62%3.    Hypoglossal Nerve Stimulation:  Hypoglossal nerve stimulation has recently received approval from the [place] Food and Drug Administration for the treatment of obstructive sleep apnea.  This is based on research showing that the system was safe and effective in treating sleep apnea6.  Results showed that the median AHI score decreased 68%,  from 29.3 to 9.0. This therapy uses an implant system that senses breathing patterns and delivers mild stimulation to airway muscles, which keeps the airway open during sleep.  The system consists of three fully implanted components: a small generator (similar in size to a pacemaker), a breathing sensor, and a stimulation lead.  Using a small handheld remote, a patient turns the therapy on before bed and off upon awakening.    Candidates for this device must be greater than 18 years of age, have moderate to severe NADEEM (AHI between 15-65), BMI less than 35, have tried CPAP/oral appliance for at least 8 weeks without success, and have appropriate upper airway anatomy (determined by a sleep endoscopy performed by Dr. Porfirio Reilly).    Hypoglossal Nerve Stimulation Pathway:    The sleep surgeon s office will work with the patient through the insurance prior-authorization process (including communications and appeals).    Nasal Procedures:  Nasal obstruction can interfere with nasal breathing during the day and night.  Studies have shown that relief of nasal obstruction can improve the ability of some patients to tolerate positive airway pressure therapy for obstructive sleep apnea1.  Treatment options include medications such as nasal saline, topical corticosteroid and antihistamine sprays, and oral medications such as antihistamines or decongestants. Non-surgical treatments can include external nasal dilators for selected patients. If these are not successful by themselves, surgery can improve the nasal airway either alone or in combination with these other options.      Combination Procedures:  Combination of surgical procedures and other treatments may be recommended, particularly if patients have more than one area of narrowing or persistent positional disease.  The success rate of combination surgery ranges from 66-80%2,3.    References  Jakub GOTTLIEB. The Role of the Nose in Snoring and Obstructive Sleep Apnoea: An  Update.  Eur Arch Otorhinolaryngol. 2011; 268: 1365-73.   Noam SM; Honey JA; Diaz JR; Pallanch JF; Carly MB; Goyo SG; Britney COPE. Surgical modifications of the upper airway for obstructive sleep apnea in adults: a systematic review and meta-analysis. SLEEP 2010;33(10):7147-5048. Nalini BRANNON. Hypopharyngeal surgery in obstructive sleep apnea: an evidence-based medicine review.  Arch Otolaryngol Head Neck Surg. 2006 Feb;132(2):206-13.  Denzel YH1, Suzanne Y, Martínez JELLY. The efficacy of anatomically based multilevel surgery for obstructive sleep apnea. Otolaryngol Head Neck Surg. 2003 Oct;129(4):327-35.  Kezirian E, Goldberg A. Hypopharyngeal Surgery in Obstructive Sleep Apnea: An Evidence-Based Medicine Review. Arch Otolaryngol Head Neck Surg. 2006 Feb;132(2):206-13.  Amber VALERIO et al. Upper-Airway Stimulation for Obstructive Sleep Apnea.  N Engl J Med. 2014 Jan 9;370(2):139-49.  Peeliezer Y et al. Increased Incidence of Cardiovascular Disease in Middle-aged Men with Obstructive Sleep Apnea. Am J Respir Crit Care Med; 2002 166: 159-165  Purvis EM et al. Studying Life Effects and Effectiveness of Palatopharyngoplasty (SLEEP) study: Subjective Outcomes of Isolated Uvulopalatopharyngoplasty. Otolaryngol Head Neck Surg. 2011; 144: 623-631.        WHAT IF I ONLY HAVE SNORING?    Mandibular advancement devices, lateral sleep positioning, long-term weight loss and treatment of nasal allergies have been shown to improve snoring.  Exercising tongue muscles with a game (https://apps.[employer]/us/elgin/soundly-reduce-snoring/yn3665147841) or stimulating the tongue during the day with a device (https://doi.org/10.3390/whz80378678) have improved snoring in some individuals.    Remember to Drive Safe... Drive Alive     Sleep health profoundly affects your health, mood, and your safety.  Thirty three percent of the population (one in three of us) is not getting enough sleep and many have a sleep disorder. Not getting enough sleep or  having an untreated / undertreated sleep condition may make us sleepy without even knowing it. In fact, our driving could be dramatically impaired due to our sleep health. As your provider, here are some things I would like you to know about driving:     Here are some warning signs for impairment and dangerous drowsy driving:              -Having been awake more than 16 hours               -Looking tired               -Eyelid drooping              -Head nodding (it could be too late at this point)              -Driving for more than 30 minutes     Some things you could do to make the driving safer if you are experiencing some drowsiness:              -Stop driving and rest              -Call for transportation              -Make sure your sleep disorder is adequately treated     Some things that have been shown NOT to work when experiencing drowsiness while driving:              -Turning on the radio              -Opening windows              -Eating any  distracting  /  entertaining  foods (e.g., sunflower seeds, candy, or any other)              -Talking on the phone      Your decision may not only impact your life, but also the life of others. Please, remember to drive safe for yourself and all of us.

## 2023-09-21 NOTE — PROGRESS NOTES
Outpatient Sleep Medicine Consultation:      Name: Venice Gaffney MRN# [id number]   Age: 38 year old YOB: 1985     Date of Consultation: September 21, 2023  Consultation is requested by: Jesus Cruz MD  [address] [place] [address] Jesus Cruz  Primary care provider: Jesus Cruz       Reason for Sleep Consult:     Venice Gaffney is sent by Jesus Cruz for a sleep consultation regarding loud snoring, daytime somnolence, possible NADEEM.    Patient s Reason for visit  Venice Gaffney main reason for visit: excessive snoring  Patient states problem(s) started: years  Venice Gaffney's goals for this visit: fix the snoring           Assessment and Plan:     Summary Sleep Diagnoses:  1. Suspected sleep apnea  2. Loud snoring  3. Witnessed apneic spells  4. Excessive daytime sleepiness  5. Insomnia, unspecified type  6. Obesity (BMI 30-39.9)  - Sleep Study Referral  - HST-Home Sleep Apnea Test - Noxturnal Returnable; Future      Comorbid Diagnoses:  1.  Migraines  2.  Nicotine dependence  3.  History of depression  4.  Anxiety with flying      Summary Recommendations:  1.  Recommend further evaluation with home sleep apnea testing (HST) for possible sleep disordered breathing.  This patient has a high pretest probability of ANDEEM with symptoms of loud snoring, witnessed apneas, excessive daytime sleepiness, difficulty falling/staying asleep, weight gain, and poorly restful sleep for several years.  Her STOP-BANG score is 5-6 which suggest a high risk of NADEEM.  Her symptoms are consistent with probable obstructive sleep apnea.  2.  All potential therapeutic options including positive airway pressure, mandibular advancing oral appliances, positional therapy, and surgical options were discussed. Also counseled about impact of weight loss on NADEEM.   3.  Her insomnia severity index is 16 today which is consistent with moderate severity clinical insomnia.  This appears to be multifactorial and is  associated with anxiety, and active mind at bedtime, and likely sleep disordered breathing contributing.  Information was provided regarding the sleep hygiene guidelines for her review in the AVS.  4.  Follow-up in approximately 2 weeks after the sleep study to review the results and to determine next steps.    Orders Placed This Encounter   Procedures    HST-Home Sleep Apnea Test - Noxturnal Returnable         Summary Counseling:    Sleep Testing Reviewed  Obstructive Sleep Apnea Reviewed  Complications of Untreated Sleep Apnea Reviewed  Previous recent chart notes, lab, imaging, and cardiology results reviewed    Medical Decision-making:   Educational materials provided in instructions    Total time spent reviewing medical records, history and physical examination, review of previous testing and interpretation as well as documentation on this date: 40 minutes    CC: Jesus Cruz          History of Present Illness:     Venice Gaffney is a 38-year-old female with PMH pertinent for migraines, nicotine dependence, anxiety with flying, history of depression, and obesity who presents today with symptoms of loud snoring, witnessed apneas, excessive daytime sleepiness, difficulty falling/staying asleep, morning headaches, neck/shoulder pains, weight gain - 30-35 lbs over last 1-2 years, and occasional poorly restful sleep for several years.  She was referred by her primary care provider for further evaluation of possible sleep disordered breathing.    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  Wakes up due to Snorting self awake;Use the bathroom;Anxiety  She has trouble falling back asleep no trouble times a week.   It usually takes pretty quick to  get back to sleep  Patient is usually up at 5:45  Uses alarm: Yes    Weekends/Non-work Days/All Other Days:  Usually gets into bed at 9   Takes patient about depends on anxiety to fall asleep  Patient is usually up at 5:45  Uses alarm: Yes    Sleep Need  Patient gets  6 hours sleep on average   Patient thinks she needs about 6-8 sleep    Venice Gaffney prefers to sleep in this position(s): Back;Side;Stomach   Patient states they do the following activities in bed: Read;Watch TV    Naps  Patient takes a purposeful nap 0 times a week and naps are usually 40 min in duration  She feels better after a nap: No  She dozes off unintentionally 4-5 days per week  Patient has had a driving accident or near-miss due to sleepiness/drowsiness: No      SLEEP DISRUPTIONS:    Breathing/Snoring  Patient snores:Yes  Other people complain about her snoring: Yes  Patient has been told she stops breathing in her sleep:Yes  She has issues with the following: Morning headaches;Morning mouth dryness    Movement:  Patient gets pain, discomfort, with an urge to move:  Yes - r/t neck/shoulder discomfort  It happens when she is resting:  Yes  It happens more at night:  Yes  Patient has been told she kicks her legs at night:  Yes     Behaviors in Sleep:  Venice Gaffney has experienced the following behaviors while sleeping: Sleep-talking;Sleepwalking;Teeth grinding  She has experienced sudden muscle weakness during the day: No      Is there anything else you would like your sleep provider to know:        CAFFEINE AND OTHER SUBSTANCES:    Patient consumes caffeinated beverages per day:  1-2  Last caffeine use is usually: 10am  List of any prescribed or over the counter stimulants that patient takes: none  List of any prescribed or over the counter sleep medication patient takes: sometimes melatonin  List of previous sleep medications that patient has tried: none  Patient drinks alcohol to help them sleep: No  Patient drinks alcohol near  bedtime: No    Family History:  Patient has a family member been diagnosed with a sleep disorder: Yes  mom - NADEEM         SCALES:    EPWORTH SLEEPINESS SCALE         9/21/2023     8:33 AM    [place] Sleepiness Scale ( JOSÉ ANTONIO Henderson  [id number]<br>ESS - [place]/English - Final version - 21 Nov 07 - [facility] [place].)   Sitting and reading High chance of dozing   Watching TV High chance of dozing   Sitting, inactive in a public place (e.g. a theatre or a meeting) Would never doze   As a passenger in a car for an hour without a break Would never doze   Lying down to rest in the afternoon when circumstances permit High chance of dozing   Sitting and talking to someone Would never doze   Sitting quietly after a lunch without alcohol Moderate chance of dozing   In a car, while stopped for a few minutes in traffic Would never doze   Natural [place] Score (MC) 11   Natural [place] Score (Sleep) 11         INSOMNIA SEVERITY INDEX (PATRICIA)          9/21/2023     8:20 AM   Insomnia Severity Index (PATRICIA)   Difficulty falling asleep 1   Difficulty staying asleep 2   Problems waking up too early 2   How SATISFIED/DISSATISFIED are you with your CURRENT sleep pattern? 3   How NOTICEABLE to others do you think your sleep problem is in terms of impairing the quality of your life? 3   How WORRIED/DISTRESSED are you about your current sleep problem? 3   To what extent do you consider your sleep problem to INTERFERE with your daily functioning (e.g. daytime fatigue, mood, ability to function at work/daily chores, concentration, memory, mood, etc.) CURRENTLY? 2   PATRICIA Total Score 16       Guidelines for Scoring/Interpretation:  Total score categories:  0-7 = No clinically significant insomnia   8-14 = Subthreshold insomnia   15-21 = Clinical insomnia (moderate severity)  22-28 = Clinical insomnia (severe)  Used via courtesy of www.ACHICAth.va.gov with permission from Diego Bashir PhD., [facility]      STOP BANG score: 5-6        9/21/2023     8:34 AM  "  STOP BANG Questionnaire (  2008, the American Society of Anesthesiologists, Inc. [employer]   1. Snoring - Do you snore loudly (louder than talking or loud enough to be heard through closed doors)? Yes   2. Tired - Do you often feel tired, fatigued, or sleepy during daytime? Yes   3. Observed - Has anyone observed you stop breathing during your sleep? Yes   4. Blood pressure - Do you have or are you being treated for high blood pressure? No   5. BMI - BMI more than 35 kg/m2? Yes   6. Age - Age over 50 yr old? No   7. Neck circumference - Neck circumference greater than 40 cm? Yes   8. Gender - Gender male? No   STOP BANG Score (MC): 6 (High risk of NADEEM)         GAD7        3/28/2022     1:07 PM   SANJUANA-7    1. Feeling nervous, anxious, or on edge 2   2. Not being able to stop or control worrying 2   3. Worrying too much about different things 3   4. Trouble relaxing 2   5. Being so restless that it is hard to sit still 1   6. Becoming easily annoyed or irritable 2   7. Feeling afraid, as if something awful might happen 2   SANJUANA-7 Total Score 14         CAGE-AID         No data to display                CAGE-AID reprinted with permission from the [place] Medical Journal, NOELLE Gonzalez. and MARTHA Gonzales, \"Conjoint screening questionnaires for alcohol and drug abuse\" [place] Medical Journal 94: 135-140, 1995.      PATIENT HEALTH QUESTIONNAIRE-9 (PHQ - 9)        2/28/2022    11:57 AM   PHQ-9 ([employer])   1.  Little interest or pleasure in doing things 3   2.  Feeling down, depressed, or hopeless 2   3.  Trouble falling or staying asleep, or sleeping too much 3   4.  Feeling tired or having little energy 3   5.  Poor appetite or overeating 2   6.  Feeling bad about yourself - or that you are a failure or have let yourself or your family down 1   7.  Trouble concentrating on things, such as reading the newspaper or watching television 2   8.  Moving or speaking so slowly that other people could have " noticed. Or the opposite - being so fidgety or restless that you have been moving around a lot more than usual 2   9.  Thoughts that you would be better off dead, or of hurting yourself in some way 0   PHQ-9 Total Score 18   If you checked off any problems, how difficult have these problems made it for you to do your work, take care of things at home, or get along with other people? Very difficult   6.  Feeling bad about yourself 1   7.  Trouble concentrating 2   8.  Moving slowly or restless 2   9.  Suicidal or self-harm thoughts 0   Difficulty at work, home, or with people Very difficult       Developed by Mili Harvey, Jocelin Fields, Erick Villarreal and colleagues, with an educational demetrius from [employer]. No permission required to reproduce, translate, display or distribute.        Allergies:    Allergies   Allergen Reactions    Sumatriptan Anaphylaxis and Other (See Comments)     Passing out  Passing out      Cat Hair Extract Other (See Comments)     Other reaction(s): Sneezing  Watery eyes, sneezing, itching  Watery eyes, sneezing, itching      Demerol [Meperidine]      Passing out    Meperidine And Related Other (See Comments)     syncope  Passed out  Passing out  Passing out      Seasonal Allergies Headache and Other (See Comments)     Other reaction(s): Sneezing    Fish Allergy Rash and Hives     Rash  Rash      Seafood Rash and Hives    Shellfish Allergy Hives and Rash       Medications:    Current Outpatient Medications   Medication Sig Dispense Refill    azelastine (ASTELIN) 0.1 % nasal spray Spray 2 sprays into both nostrils 2 times daily 30 mL 3    cetirizine (ZYRTEC) 10 MG tablet Take 1 tablet (10 mg) by mouth daily 90 tablet 3    EPINEPHrine (ANY BX GENERIC EQUIV) 0.3 MG/0.3ML injection 2-pack Inject into outer thigh for allergic reaction 4 each 0    fluticasone (FLONASE) 50 MCG/ACT nasal spray Spray 1 spray into both nostrils daily 9.9 mL 3    ORDER FOR ALLERGEN IMMUNOTHERAPY Vaccine A  MOLD/ INDOORALLERGENS/ RAGWEED  DFM Df Mite D farinae 10,000 AU, 0.5 ml  DPM Dp Mite D pteronyssinus. 10,000 AU, 0.5 ml  Vaccine B POLLENS/ CAT  CAT Cat Hair 10,000 BAU 2.0 ml  GABRIELE Gabriele, White Fraxinus Americana 1:20 w/v, 0.5 ml  Dilutions: None (red) Frequency: weekly  1/10 (yellow) Frequency: weekly  1/100 (blue) Frequency: weekly  1/1000 (green) Frequency: weekly      Diluent: HSA qs to 5ml 5 mL 4    sertraline (ZOLOFT) 100 MG tablet Take 1 tablet (100 mg) by mouth daily 90 tablet 1       Problem List:  Patient Active Problem List    Diagnosis Date Noted    Hypertrophy of labia minora 05/18/2023     Priority: Medium     Right labia minora enlarged compared to left, seems congenital as been present as long as patient can remember, no diagnosis code for asymmetric labia minora       Diverticulitis of colon 11/07/2021     Priority: Medium    Obesity 11/03/2021     Priority: Medium    Smoker 10/08/2019     Priority: Medium     Overview:   started age 20, has tried to quit, has cut down to 1/2 ppd      Anxiety with flying 06/10/2019     Priority: Medium    Migraines      Priority: Medium        Past Medical/Surgical History:  Past Medical History:   Diagnosis Date    Abnormal Pap smear 2011    Migraines      Past Surgical History:   Procedure Laterality Date    COLON SURGERY      partial colon removal due to diverticulitis    HYSTEROSCOPY, ABLATE ENDOMETRIUM HYDROTHERMAL, COMBINED N/A 11/15/2019    Procedure: HYSTEROSCOPY, DILATION AND CURETTAGE, ENDOMETRIAL ABLATION, REMOVAL OF NEXPLANON;  Surgeon: Trevon Chao MD;  Location: [facility];  Service: Gynecology    NO HISTORY OF SURGERY      OH LAP,TUBAL CAUTERY Bilateral 11/15/2019    Procedure: LAPAROSCOPIC BILATERAL TUBAL LIGATION, HYSTEROSCOPY, DILATION AND CURETTAGE, ENDOMETRIAL ABLATION, REMOVAL OF NEXPLANON;  Surgeon: Trevon Chao MD;  Location: [facility];  Service: Gynecology    TUBAL LIGATION  11/2020       Social History:  Social History      Socioeconomic History    Marital status:      Spouse name: Not on file    Number of children: Not on file    Years of education: Not on file    Highest education level: Not on file   Occupational History    Not on file   Tobacco Use    Smoking status: Every Day     Packs/day: 1.00     Years: 9.00     Pack years: 9.00     Types: Other, Cigarettes    Smokeless tobacco: Never    Tobacco comments:     Vaping   Vaping Use    Vaping Use: Every day   Substance and Sexual Activity    Alcohol use: Yes     Alcohol/week: 0.8 standard drinks of alcohol     Types: 1 Standard drinks or equivalent per week     Comment: 1-2 times per month    Drug use: No    Sexual activity: Yes     Partners: Male     Birth control/protection: Condom   Other Topics Concern    Parent/sibling w/ CABG, MI or angioplasty before 65F 55M? No   Social History Narrative    Not on file     Social Determinants of Health     Financial Resource Strain: Not on file   Food Insecurity: Not on file   Transportation Needs: Not on file   Physical Activity: Not on file   Stress: Not on file   Social Connections: Not on file   Interpersonal Safety: Not on file   Housing Stability: Not on file       Family History:  Family History   Problem Relation Age of Onset    Diabetes Mother     Eye Disorder Mother         glaucoma    Hypertension Mother     Lipids Mother     Neurologic Disorder Mother         migraines and MS    Alcohol/Drug Father         alcohol     Depression Father        Review of Systems:  A complete review of systems reviewed by me is negative with the exeption of what has been mentioned in the history of present illness.  In the last TWO WEEKS have you experienced any of the following symptoms?  Fevers: No  Night Sweats: No  Weight Gain: Yes  Pain at Night: Yes  Double Vision: No  Changes in Vision: No  Difficulty Breathing through Nose: Yes  Sore Throat in Morning: No  Dry Mouth in the Morning: Yes  Shortness of Breath Lying Flat:  "No  Shortness of Breath With Activity: Yes  Awakening with Shortness of Breath: No  Increased Cough: No  Heart Racing at Night: No  Swelling in Feet or Legs: No  Diarrhea at Night: No  Heartburn at Night: No  Urinating More than Once at Night: Yes  Losing Control of Urine at Night: No  Joint Pains at Night: Yes  Headaches in Morning: Yes  Weakness in Arms or Legs: No  Depressed Mood: Yes  Anxiety: Yes     Physical Examination:  Vitals: /82   Pulse 99   Resp 18   Ht 1.549 m (5' 1\")   Wt 87.1 kg (192 lb)   SpO2 96%   BMI 36.28 kg/m    BMI= Body mass index is 36.28 kg/m .    Neck Cir (cm): 40 cm      GENERAL APPEARANCE: healthy, alert, no distress, and cooperative  EYES: Eyes grossly normal to inspection, PERRL, conjunctivae and sclerae normal, lids and lashes normal, and wearing eyeglasses  HENT: nose and mouth without ulcers or lesions, tonsillar erythema, and normal cephalic/atraumatic  NECK: no adenopathy, no asymmetry, masses, or scars, thyroid normal to palpation, and trachea midline and normal to palpation  RESP: lungs clear to auscultation - no rales, rhonchi or wheezes  CV: regular rates and rhythm, normal S1 S2, no S3 or S4, and no murmur, click or rub  ABDOMEN: bowel sounds normal, obese, and soft, non-tender  MS: extremities normal- no gross deformities noted  NEURO: Alert and oriented x3, normal strength and tone, mentation intact, and speech normal  PSYCH: mentation appears normal and affect normal/bright  Mallampati Class: II.  Tonsillar Stage: 2  visible at pillars.         Data: All pertinent previous laboratory data reviewed     Recent Labs   Lab Test 10/15/21  1203 08/13/18  1617    142.0   POTASSIUM 4.6 4.4   CHLORIDE 103 102.0   CO2 22 29.0   ANIONGAP 11  --    GLC 92 100.0   BUN 10 9.0   CR 0.86 1.0   AYANA 10.4 9.6       Recent Labs   Lab Test 10/15/21  1203   WBC 8.6   RBC 4.76   HGB 14.1   HCT 41.4   MCV 87   MCH 29.6   MCHC 34.1   RDW 12.3          Recent Labs   Lab Test " 08/13/18  1617   PROTTOTAL 7.1   ALBUMIN 4.1   BILITOTAL 0.9   ALKPHOS 47.0   AST 29.0   ALT 23.0       TSH (uIU/mL)   Date Value   05/25/2023 2.17       No results found for: UAMP, UBARB, BENZODIAZEUR, UCANN, UCOC, OPIT, UPCP    No results found for: IRONSAT, [id number], MARY LOU    No results found for: PH, PHARTERIAL, PO2, RR3ICNXSDEE, SAT, PCO2, HCO3, BASEEXCESS, CRISTINO, BEB    @LABRCNTIPR(phv:4,pco2v:4,po2v:4,hco3v:4,mandi:4,o2per:4)@    Echocardiology: No results found for this or any previous visit (from the past 4320 hour(s)).  12/21/2018 Echocardiogram Complete  Narrative & Impression    Left ventricle ejection fraction is normal. The calculated left ventricular ejection fraction is 74%.    Normal left ventricular size and systolic function.    Normal right ventricular size and systolic function.    No hemodynamically significant valvular heart abnormalities.    No previous study for comparison.      Chest x-ray: No results found for this or any previous visit from the past 365 days.  EXAM: XR CHEST 2 VIEWS PA AND LATERAL   LOCATION: [facility]   DATE/TIME: 6/18/2021 9:51 AM     INDICATION: Cough   COMPARISON: None.     IMPRESSION: Negative chest.       Chest CT: No results found for this or any previous visit from the past 365 days.      PFT: Most Recent [facility]ze Pulmonary Function Testing    No results found for: 20001  No results found for: 20002  No results found for: 20003  No results found for: 20015  No results found for: 20016  No results found for: 20027  No results found for: 20028  No results found for: 20029  No results found for: 20079  No results found for: 20080  No results found for: 20081  No results found for: 20335  No results found for: 20105  No results found for: 20053  No results found for: 20054  No results found for: 20055      MARY Sam CNP 9/21/2023   Sleep Medicine      This note was written with the assistance of the Dragon voice-[employer] Pointation technology  software. The final document, although reviewed, may contain errors. For corrections, please contact the office.

## 2023-12-28 ENCOUNTER — OFFICE VISIT (OUTPATIENT)
Dept: SLEEP MEDICINE | Facility: CLINIC | Age: 38
End: 2023-12-28
Payer: COMMERCIAL

## 2023-12-28 DIAGNOSIS — G47.00 INSOMNIA, UNSPECIFIED TYPE: ICD-10-CM

## 2023-12-28 DIAGNOSIS — G47.19 EXCESSIVE DAYTIME SLEEPINESS: ICD-10-CM

## 2023-12-28 DIAGNOSIS — R29.818 SUSPECTED SLEEP APNEA: ICD-10-CM

## 2023-12-28 DIAGNOSIS — R06.83 LOUD SNORING: ICD-10-CM

## 2023-12-28 DIAGNOSIS — R06.81 WITNESSED APNEIC SPELLS: ICD-10-CM

## 2023-12-28 DIAGNOSIS — E66.9 OBESITY (BMI 30-39.9): ICD-10-CM

## 2023-12-28 DIAGNOSIS — G47.33 OSA (OBSTRUCTIVE SLEEP APNEA): Primary | ICD-10-CM

## 2023-12-28 PROCEDURE — G0399 HOME SLEEP TEST/TYPE 3 PORTA: HCPCS | Performed by: INTERNAL MEDICINE

## 2023-12-28 ASSESSMENT — SLEEP AND FATIGUE QUESTIONNAIRES
HOW LIKELY ARE YOU TO NOD OFF OR FALL ASLEEP WHILE WATCHING TV: MODERATE CHANCE OF DOZING
HOW LIKELY ARE YOU TO NOD OFF OR FALL ASLEEP WHILE SITTING QUIETLY AFTER LUNCH WITHOUT ALCOHOL: MODERATE CHANCE OF DOZING
HOW LIKELY ARE YOU TO NOD OFF OR FALL ASLEEP WHILE LYING DOWN TO REST IN THE AFTERNOON WHEN CIRCUMSTANCES PERMIT: HIGH CHANCE OF DOZING
HOW LIKELY ARE YOU TO NOD OFF OR FALL ASLEEP WHILE SITTING INACTIVE IN A PUBLIC PLACE: SLIGHT CHANCE OF DOZING
HOW LIKELY ARE YOU TO NOD OFF OR FALL ASLEEP WHILE SITTING AND TALKING TO SOMEONE: WOULD NEVER DOZE
HOW LIKELY ARE YOU TO NOD OFF OR FALL ASLEEP WHILE SITTING AND READING: HIGH CHANCE OF DOZING
HOW LIKELY ARE YOU TO NOD OFF OR FALL ASLEEP IN A CAR, WHILE STOPPED FOR A FEW MINUTES IN TRAFFIC: WOULD NEVER DOZE
HOW LIKELY ARE YOU TO NOD OFF OR FALL ASLEEP WHEN YOU ARE A PASSENGER IN A CAR FOR AN HOUR WITHOUT A BREAK: MODERATE CHANCE OF DOZING

## 2023-12-28 NOTE — PROGRESS NOTES
Pt is completing a home sleep test. Pt was instructed on how to put on the Noxturnal T3 device and associated equipment before going to bed and given the opportunity to practice putting it on before leaving the sleep center. Pt was reminded to bring the home sleep test kit back to the center tomorrow, at agreed upon time for download and reporting.   Neck circumference: 40 CM / 15.75 inches.

## 2023-12-29 ENCOUNTER — DOCUMENTATION ONLY (OUTPATIENT)
Dept: SLEEP MEDICINE | Facility: CLINIC | Age: 38
End: 2023-12-29
Payer: COMMERCIAL

## 2024-01-02 NOTE — PROGRESS NOTES
HST POST-STUDY QUESTIONNAIRE    What time did you go to bed?  10p  How long do you think it took to fall asleep?  45min  What time did you wake up to start the day?  0530  Did you get up during the night at all?  Yes 11:30 and 2am  If you woke up, do you remember approximately what time(s)? 11:30 qquick to fall back asleep 2-3:30  Did you have any difficulty with the equipment?  Yes nose pice fell out  Did you us any type of treatment with this study?  None  Was the head of the bed elevated? No  Did you sleep in a recliner?  No  Did you stop using CPAP at least 3 days before this test?  NA  Any other information you'd like us to know? Benadryl and sudafed taken at 9pm and 3am

## 2024-01-09 ENCOUNTER — PATIENT OUTREACH (OUTPATIENT)
Dept: CARE COORDINATION | Facility: CLINIC | Age: 39
End: 2024-01-09
Payer: COMMERCIAL

## 2024-01-09 NOTE — PROGRESS NOTES
Clinic Care Coordination Contact  Follow Up Progress Note      Assessment:  The pt was recently in the ED, I called to check up on the pt and help the pt setup a ED follow up. I called the pt,but got her vm, so I left a vm for the pt to give me a call back.     Care Gaps:    Health Maintenance Due   Topic Date Due    ADVANCE CARE PLANNING  Never done    HEPATITIS B IMMUNIZATION (1 of 3 - 3-dose series) Never done    Pneumococcal Vaccine: Pediatrics (0 to 5 Years) and At-Risk Patients (6 to 64 Years) (1 of 2 - PCV) Never done    YEARLY PREVENTIVE VISIT  03/01/2002    DTAP/TDAP/TD IMMUNIZATION (3 - Td or Tdap) 09/11/2021    NICOTINE/TOBACCO CESSATION COUNSELING Q 1 YR  10/15/2022    INFLUENZA VACCINE (1) 09/01/2023    COVID-19 Vaccine (3 - 2023-24 season) 09/01/2023    PHQ-2 (once per calendar year)  01/01/2024           Care Plans      Intervention/Education provided during outreach:               Plan:     Care Coordinator will follow up in

## 2024-01-10 ENCOUNTER — PATIENT OUTREACH (OUTPATIENT)
Dept: CARE COORDINATION | Facility: CLINIC | Age: 39
End: 2024-01-10
Payer: COMMERCIAL

## 2024-01-10 NOTE — PROGRESS NOTES
This HSAT was performed using a Noxturnal T3 device which recorded snore, sound, movement activity, body position, nasal pressure, oronasal thermal airflow, pulse, oximetry and both chest and abdominal respiratory effort. HSAT data was restricted to the time patient states they were in bed.     HSAT was scored using 1B 4% hypopnea rule.     HST AHI (Non-PAT): 40.8  Snoring was reported as moderate.  Time with SpO2 below 89% was 40.7 minutes.   Overall signal quality was fair     Pt will follow up with sleep provider to determine appropriate therapy.

## 2024-01-15 ENCOUNTER — OFFICE VISIT (OUTPATIENT)
Dept: FAMILY MEDICINE | Facility: CLINIC | Age: 39
End: 2024-01-15
Payer: COMMERCIAL

## 2024-01-15 VITALS
OXYGEN SATURATION: 97 % | WEIGHT: 190 LBS | TEMPERATURE: 97.7 F | SYSTOLIC BLOOD PRESSURE: 130 MMHG | HEIGHT: 61 IN | DIASTOLIC BLOOD PRESSURE: 86 MMHG | HEART RATE: 90 BPM | BODY MASS INDEX: 35.87 KG/M2

## 2024-01-15 DIAGNOSIS — H66.002 NON-RECURRENT ACUTE SUPPURATIVE OTITIS MEDIA OF LEFT EAR WITHOUT SPONTANEOUS RUPTURE OF TYMPANIC MEMBRANE: ICD-10-CM

## 2024-01-15 DIAGNOSIS — J01.90 ACUTE SINUSITIS TREATED WITH ANTIBIOTICS IN THE PAST 60 DAYS: Primary | ICD-10-CM

## 2024-01-15 PROCEDURE — 90715 TDAP VACCINE 7 YRS/> IM: CPT | Performed by: FAMILY MEDICINE

## 2024-01-15 PROCEDURE — 99213 OFFICE O/P EST LOW 20 MIN: CPT | Mod: 25 | Performed by: FAMILY MEDICINE

## 2024-01-15 PROCEDURE — 91320 SARSCV2 VAC 30MCG TRS-SUC IM: CPT | Performed by: FAMILY MEDICINE

## 2024-01-15 PROCEDURE — 90480 ADMN SARSCOV2 VAC 1/ONLY CMP: CPT | Performed by: FAMILY MEDICINE

## 2024-01-15 PROCEDURE — 90471 IMMUNIZATION ADMIN: CPT | Performed by: FAMILY MEDICINE

## 2024-01-15 PROCEDURE — 90677 PCV20 VACCINE IM: CPT | Performed by: FAMILY MEDICINE

## 2024-01-15 PROCEDURE — 90746 HEPB VACCINE 3 DOSE ADULT IM: CPT | Performed by: FAMILY MEDICINE

## 2024-01-15 PROCEDURE — 90472 IMMUNIZATION ADMIN EACH ADD: CPT | Performed by: FAMILY MEDICINE

## 2024-01-15 RX ORDER — OXYMETAZOLINE HYDROCHLORIDE 0.05 G/100ML
2 SPRAY NASAL 2 TIMES DAILY
Qty: 15 ML | Refills: 0 | Status: SHIPPED | OUTPATIENT
Start: 2024-01-15

## 2024-01-15 NOTE — PATIENT INSTRUCTIONS
For your sinus infection and blocked eustachian tube (middle ear infection/inflammation) start the antibiotic Augmentin 1 tablet twice daily for 7 days.    Unfortunately a common side effect of this antibiotic is loose stools.  Continue with the medication if tolerable, if it becomes intolerable call for advice.    Also to improve your hearing, reduce congestion, and reduce the pressure in your middle ear start the nasal spray Afrin (oxy metolazone) 2 sprays to each nostril twice a day for 5 days.    Do not use the nasal spray for more than 5 days, as you will likely develop rebound congestion if you use this medication for prolonged period of time    I anticipate your symptoms will be significantly improved within 1 week.  If not return for reevaluation.    I do not expect that you will develop any fevers, increased cough, difficulty with breathing, or other new symptoms, if these develop return for reevaluation.

## 2024-01-25 PROBLEM — G47.33 OSA (OBSTRUCTIVE SLEEP APNEA): Status: ACTIVE | Noted: 2024-01-25

## 2024-01-25 NOTE — PROCEDURES
"HOME SLEEP STUDY INTERPRETATION        Patient: Venice Gaffney  MRN: [id number]  YOB: 1985  Study Date: 2023  PCP/Referring Provider: Jesus Cruz MD  Ordering Provider:   MARY Sam CNP         Indications for Home Study: Venice Gaffney is a 38 year old female with a history of migraines who presents with symptoms suggestive of obstructive sleep apnea.    Estimated body mass index is 35.41 kg/m  as calculated from the following:    Height as of 1/15/24: 1.56 m (5' 1.42\").    Weight as of 1/15/24: 86.2 kg (190 lb).  Total score - [place]: 13 (2023 12:50 PM)  STOP-BAN/8        Data: A full night home sleep study was performed recording the standard physiologic parameters including body position, movement, sound, nasal pressure, thermal oral airflow, chest and abdominal movements with respiratory inductance plethysmography, and oxygen saturation by pulse oximetry. Pulse rate was estimated by oximetry recording. This study was considered adequate based on > 4 hours of quality oximetry and respiratory recording. As specified by the AASM Manual for the Scoring of Sleep and Associated events, version 2.3, Rule VIII.D 1B, 4% oxygen desaturation scoring for hypopneas is used as a standard of care on all home sleep apnea testing.        Analysis Time:  439.6 minutes        Respiration:   Sleep Associated Hypoxemia: sustained hypoxemia was present. Baseline oxygen saturation was 96%.  Time with saturation less than or equal to 88% was 27 minutes. The lowest oxygen saturation was 73%.   Snoring: Snoring was present.  Respiratory events: The home study revealed a presence of 104 obstructive apneas and 1 mixed and central apneas. There were 194 hypopneas resulting in a combined apnea/hypopnea index [AHI] of 40.8 events per hour.  AHI was 54.2 per hour supine, NA per hour prone, 65.6 per hour on left side, and 15.6 per hour on right side.   Pattern: Excluding events noted above, " respiratory rate and pattern was Normal.      Position: Percent of time spent: supine - 56%, prone - 0%, on left - 8%, on right - 37%.      Heart Rate: By pulse oximetry tachycardia was noted.       Assessment:   Severe obstructive sleep apnea with AHI 40.8 events per hour.  Sleep associated hypoxemia was present.    Recommendations:  Consider auto-CPAP at 5-15 cmH2O or polysomnography with full night PAP titration.  Suggest optimizing sleep hygiene and avoiding sleep deprivation.  Weight management.        Diagnosis Code(s): Obstructive Sleep Apnea G47.33, Hypoxemia G47.36    Electronically signed by: Kellen Sears MD, January 25, 2024   Diplomate, American Board of Internal Medicine, Sleep Medicine

## 2024-02-05 NOTE — PROGRESS NOTES
"       HPI:   Venice Gaffney is a 38 year old  female who presents for:    Chief Complaint   Patient presents with    Hospital F/U     [facility] DOS 1/6/24    Nasal Congestion    Headache       4 weeks of sinus congestion and pressure.  2 weeks of decreased hearing, and pressure in the ears left greater than right.  She was seen and evaluated in the emergency department on 1/6/2024 due to a sharp headache to the top of her head after experiencing an aura of \"rainbow light\".  She was treated in the emergency department with acute pain and nausea medications with improvement in her headache.  She had been experiencing sinus pressure, and ear symptoms for couple of weeks prior to this emergency department visit, and the symptoms have progressed since that time.    No fevers.  Occasional nonproductive cough.  Thick intermittent nasal discharge.  No shortness of breath.  Facial pressure and facial pain.  Significantly decreased hearing in the past couple of weeks.  No ear drainage.  No dental pain.    The emergency department record from 1/6/2024 was reviewed.  No medications were prescribed for outpatient use.  The medication list was reconciled.             PMHX:     Patient Active Problem List   Diagnosis    Migraines    Anxiety with flying    Smoker    Diverticulitis of colon    Hypertrophy of labia minora    Obesity    NADEEM (obstructive sleep apnea)       Current Outpatient Medications   Medication Sig Dispense Refill    azelastine (ASTELIN) 0.1 % nasal spray Spray 2 sprays into both nostrils 2 times daily 30 mL 3    cetirizine (ZYRTEC) 10 MG tablet Take 1 tablet (10 mg) by mouth daily 90 tablet 3    EPINEPHrine (ANY BX GENERIC EQUIV) 0.3 MG/0.3ML injection 2-pack Inject into outer thigh for allergic reaction 4 each 0    fluticasone (FLONASE) 50 MCG/ACT nasal spray Spray 1 spray into both nostrils daily 9.9 mL 3    oxymetazoline (AFRIN) 0.05 % nasal spray Spray 0.2 mLs (2 sprays) into both nostrils 2 times daily 15 mL 0 "    ORDER FOR ALLERGEN IMMUNOTHERAPY Vaccine A MOLD/ INDOORALLERGENS/ RAGWEED  DFM Df Mite D farinae 10,000 AU, 0.5 ml  DPM Dp Mite D pteronyssinus. 10,000 AU, 0.5 ml  Vaccine B POLLENS/ CAT  CAT Cat Hair 10,000 BAU 2.0 ml  GABRIELE Gabriele, White Fraxinus Americana 1:20 w/v, 0.5 ml  Dilutions: None (red) Frequency: weekly  1/10 (yellow) Frequency: weekly  1/100 (blue) Frequency: weekly  1/1000 (green) Frequency: weekly      Diluent: HSA qs to 5ml 5 mL 4       Social History     Tobacco Use    Smoking status: Former     Types: Other, Cigarettes    Smokeless tobacco: Current    Tobacco comments:     Vaping   Vaping Use    Vaping Use: Every day   Substance Use Topics    Alcohol use: Yes     Alcohol/week: 0.8 standard drinks of alcohol     Types: 1 Standard drinks or equivalent per week     Comment: 1-2 times per month    Drug use: No       Social History     Social History Narrative    Not on file       Allergies   Allergen Reactions    Sumatriptan Anaphylaxis and Other (See Comments)     Passing out  Passing out      Cat Hair Extract Other (See Comments)     Other reaction(s): Sneezing  Watery eyes, sneezing, itching  Watery eyes, sneezing, itching      Demerol [Meperidine]      Passing out    Meperidine And Related Other (See Comments)     syncope  Passed out  Passing out  Passing out      Seasonal Allergies Headache and Other (See Comments)     Other reaction(s): Sneezing    Fish Allergy Rash and Hives     Rash  Rash      Seafood Rash and Hives    Shellfish Allergy Hives and Rash       No results found for this or any previous visit (from the past 24 hour(s)).         Review of Systems:     General: No fever  ENT: As outlined in the HPI.  No visual changes.  No redness of the eyes.  Respiratory: No shortness of breath.  Lifelong snoring.  Cardiovascular: No palpitations or chest pain  GI: No vomiting.  Skin: No new rash           Physical Exam:     Vitals:    01/15/24 0848   BP: 130/86   Pulse: 90   Temp: 97.7  F (36.5  C)  "  TempSrc: Oral   SpO2: 97%   Weight: 86.2 kg (190 lb)   Height: 1.56 m (5' 1.42\")     Body mass index is 35.41 kg/m .    General: Alert,   in no acute distress  HEENT: Head is free of trauma.   Sclerae non-icteric. PERRL, Moist oral mucus membranes, no tonsilar hypertrophy or exudate.  Tenderness to palpation along the frontal and maxillary sinuses.  Left TM is erythematous, effusion, and distortion of the bony landmarks.  Right TM is retracted.  Swollen inferior nasal turbinates.  Neck: No adenopathy.  Resp: Clear to auscultation bilaterally  CV: Regular rate and rhythm  Abd: Soft, non-tender.  Ext: Warm.    Skin: exposed skin free of rash  Psych: Mood appropriate       Assessment and Plan   1. Acute sinusitis   Over 4 weeks of sinus pressure and pain  Sinus tenderness on exam  Acute left otitis on exam  Recommend antibiotic treatment as well as treatment to open up her eustachian tubes    We discussed the risks and benefits of antibiotic treatment, and the potential side effects of Augmentin including the rare but serious side effect of allergic reaction, and the more common side effects of loose stool, GI upset, yeast infection, despite the risks patient consented to treatment.    Augmentin 875/125 mg 1 tablet twice a day for 7 days    Afrin nasal spray 2 sprays to both nostrils twice daily for no more than 5 days      - amoxicillin-clavulanate (AUGMENTIN) 875-125 MG tablet; Take 1 tablet by mouth 2 times daily for 7 days  Dispense: 14 tablet; Refill: 0  - oxymetazoline (AFRIN) 0.05 % nasal spray; Spray 0.2 mLs (2 sprays) into both nostrils 2 times daily  Dispense: 15 mL; Refill: 0    2. Non-recurrent acute suppurative otitis media of left ear without spontaneous rupture of tympanic membrane  Antibiotic treatment as outlined  Eustachian tube dysfunction to be treated with Afrin  Anticipate improvement if hearing within 2 to 3 weeks  Return if symptoms have not completely resolved and hearing not back to normal " within 4 weeks    - amoxicillin-clavulanate (AUGMENTIN) 875-125 MG tablet; Take 1 tablet by mouth 2 times daily for 7 days  Dispense: 14 tablet; Refill: 0      Likely obstructive sleep apnea: She has been referred for a sleep study and is currently undergoing evaluation    Follow up: If no significant improvement within 1 week, and if hearing has not returned to normal within 4 weeks.    Options for treatment and follow-up care were reviewed with the patient and/or guardian. Venice Gaffney and/or guardian engaged in the decision making process and verbalized understanding of the options discussed and agreed with the final plan.    Jesus Cruz MD  Faculty - [facility] Medicine Residency Program

## 2024-02-26 ENCOUNTER — OFFICE VISIT (OUTPATIENT)
Dept: FAMILY MEDICINE | Facility: CLINIC | Age: 39
End: 2024-02-26
Payer: COMMERCIAL

## 2024-02-26 VITALS
BODY MASS INDEX: 35.5 KG/M2 | HEIGHT: 61 IN | WEIGHT: 188 LBS | HEART RATE: 96 BPM | TEMPERATURE: 97.7 F | SYSTOLIC BLOOD PRESSURE: 122 MMHG | DIASTOLIC BLOOD PRESSURE: 87 MMHG

## 2024-02-26 DIAGNOSIS — J06.9 VIRAL UPPER RESPIRATORY TRACT INFECTION: ICD-10-CM

## 2024-02-26 DIAGNOSIS — F40.243 ANXIETY WITH FLYING: Primary | ICD-10-CM

## 2024-02-26 DIAGNOSIS — Z23 NEED FOR PROPHYLACTIC VACCINATION AND INOCULATION AGAINST INFLUENZA: ICD-10-CM

## 2024-02-26 PROBLEM — E66.812 CLASS 2 SEVERE OBESITY DUE TO EXCESS CALORIES WITH SERIOUS COMORBIDITY IN ADULT (H): Status: ACTIVE | Noted: 2024-02-26

## 2024-02-26 PROBLEM — E66.01 CLASS 2 SEVERE OBESITY DUE TO EXCESS CALORIES WITH SERIOUS COMORBIDITY IN ADULT (H): Status: ACTIVE | Noted: 2024-02-26

## 2024-02-26 PROCEDURE — 90746 HEPB VACCINE 3 DOSE ADULT IM: CPT | Performed by: FAMILY MEDICINE

## 2024-02-26 PROCEDURE — 90471 IMMUNIZATION ADMIN: CPT | Performed by: FAMILY MEDICINE

## 2024-02-26 PROCEDURE — 90472 IMMUNIZATION ADMIN EACH ADD: CPT | Performed by: FAMILY MEDICINE

## 2024-02-26 PROCEDURE — 99214 OFFICE O/P EST MOD 30 MIN: CPT | Mod: 25 | Performed by: FAMILY MEDICINE

## 2024-02-26 PROCEDURE — 90686 IIV4 VACC NO PRSV 0.5 ML IM: CPT | Performed by: FAMILY MEDICINE

## 2024-02-26 RX ORDER — CLONAZEPAM 2 MG/1
TABLET ORAL
Qty: 4 TABLET | Refills: 0 | Status: SHIPPED | OUTPATIENT
Start: 2024-02-26

## 2024-02-26 ASSESSMENT — PATIENT HEALTH QUESTIONNAIRE - PHQ9
SUM OF ALL RESPONSES TO PHQ QUESTIONS 1-9: 4
5. POOR APPETITE OR OVEREATING: SEVERAL DAYS

## 2024-02-26 ASSESSMENT — ANXIETY QUESTIONNAIRES
6. BECOMING EASILY ANNOYED OR IRRITABLE: MORE THAN HALF THE DAYS
1. FEELING NERVOUS, ANXIOUS, OR ON EDGE: SEVERAL DAYS
2. NOT BEING ABLE TO STOP OR CONTROL WORRYING: SEVERAL DAYS
3. WORRYING TOO MUCH ABOUT DIFFERENT THINGS: MORE THAN HALF THE DAYS
IF YOU CHECKED OFF ANY PROBLEMS ON THIS QUESTIONNAIRE, HOW DIFFICULT HAVE THESE PROBLEMS MADE IT FOR YOU TO DO YOUR WORK, TAKE CARE OF THINGS AT HOME, OR GET ALONG WITH OTHER PEOPLE: SOMEWHAT DIFFICULT
5. BEING SO RESTLESS THAT IT IS HARD TO SIT STILL: SEVERAL DAYS
7. FEELING AFRAID AS IF SOMETHING AWFUL MIGHT HAPPEN: SEVERAL DAYS
GAD7 TOTAL SCORE: 9
GAD7 TOTAL SCORE: 9

## 2024-02-26 NOTE — PROGRESS NOTES
HPI:   Venice Gaffney is a 38 year old  female who presents for:    Chief Complaint   Patient presents with    Anxiety    Imm/Inj     Flu Shot     Has a trip to [place] planned.  Has had severe flight anxiety in the past.  Has tried hydroxyzine which was not effective to manage her anxiety.  On her last flight she used lorazepam 1 to 2 mg prior to the flight which was very effective in managing her anxiety.  She was able to endure the flight without significant anxiety symptoms.  She would like to use lorazepam again for her upcoming flight.  She has a flight with a layover both to and from her final destination.  She is traveling with others who will be available for support and navigation.    At her last visit she had severe sinus congestion and difficulty hearing.  She was treated for sinus infection with antibiotics.  Symptoms improved.  Recently she has had more sinus congestion.  Has not used any allergy medications, nasal sprays or nasal steroids recently.  She did use some Afrin back in January.  No fevers or chills  No significant cough or shortness of breath  No significant headache.  No visual changes.  No recent hearing changes.  No sore throat.  No known ill contacts.  No no exposures.  No known allergens.    She has not yet had the influenza vaccine this season.    Obstructive sleep apnea: She did have her sleep study which showed severe sleep apnea.  She is still waiting for follow-up sleep medicine appointment to get fitted for CPAP.           PMHX:     Patient Active Problem List   Diagnosis    Migraines    Anxiety with flying    Smoker    Diverticulitis of colon    Hypertrophy of labia minora    Obesity    NADEEM (obstructive sleep apnea)    Class 2 severe obesity due to excess calories with serious comorbidity in adult (H)       Current Outpatient Medications   Medication Sig Dispense Refill    clonazePAM (KLONOPIN) 2 MG tablet Take 2mg by mouth about 1 hour prior to flight 4 tablet 0     azelastine (ASTELIN) 0.1 % nasal spray Spray 2 sprays into both nostrils 2 times daily 30 mL 3    cetirizine (ZYRTEC) 10 MG tablet Take 1 tablet (10 mg) by mouth daily 90 tablet 3    EPINEPHrine (ANY BX GENERIC EQUIV) 0.3 MG/0.3ML injection 2-pack Inject into outer thigh for allergic reaction 4 each 0    fluticasone (FLONASE) 50 MCG/ACT nasal spray Spray 1 spray into both nostrils daily 9.9 mL 3    ORDER FOR ALLERGEN IMMUNOTHERAPY Vaccine A MOLD/ INDOORALLERGENS/ RAGWEED  DFM Df Mite D farinae 10,000 AU, 0.5 ml  DPM Dp Mite D pteronyssinus. 10,000 AU, 0.5 ml  Vaccine B POLLENS/ CAT  CAT Cat Hair 10,000 BAU 2.0 ml  GABRIELE Gabriele, White Fraxinus Americana 1:20 w/v, 0.5 ml  Dilutions: None (red) Frequency: weekly  1/10 (yellow) Frequency: weekly  1/100 (blue) Frequency: weekly  1/1000 (green) Frequency: weekly      Diluent: HSA qs to 5ml 5 mL 4    oxymetazoline (AFRIN) 0.05 % nasal spray Spray 0.2 mLs (2 sprays) into both nostrils 2 times daily 15 mL 0       Social History     Tobacco Use    Smoking status: Former     Types: Other, Cigarettes    Smokeless tobacco: Current    Tobacco comments:     Vaping   Vaping Use    Vaping Use: Every day   Substance Use Topics    Alcohol use: Yes     Alcohol/week: 0.8 standard drinks of alcohol     Types: 1 Standard drinks or equivalent per week     Comment: 1-2 times per month    Drug use: No       Social History     Social History Narrative    Not on file       Allergies   Allergen Reactions    Sumatriptan Anaphylaxis and Other (See Comments)     Passing out  Passing out      Cat Hair Extract Other (See Comments)     Other reaction(s): Sneezing  Watery eyes, sneezing, itching  Watery eyes, sneezing, itching      Demerol [Meperidine]      Passing out    Meperidine And Related Other (See Comments)     syncope  Passed out  Passing out  Passing out      Seasonal Allergies Headache and Other (See Comments)     Other reaction(s): Sneezing    Fish Allergy Rash and Hives     Rash  Rash       "Seafood Rash and Hives    Shellfish Allergy Hives and Rash       No results found for this or any previous visit (from the past 24 hour(s)).         Review of Systems:     General: No fevers  ENT: Symptoms as in the HPI  Cardiovascular: No palpitations or chest pain  Respiratory: No cough or shortness of breath  Psych: She weaned from sertraline back about 9 months ago.  She describes doing well from an anxiety standpoint.           Physical Exam:     Vitals:    02/26/24 0841   BP: 122/87   Pulse: 96   Temp: 97.7  F (36.5  C)   TempSrc: Oral   Weight: 85.3 kg (188 lb)   Height: 1.56 m (5' 1.42\")     Body mass index is 35.04 kg/m .    General: Alert,  e in no acute distress  HEENT: Head is free of trauma.   Sclerae non-icteric. PERRL, Moist oral mucus membranes, no tonsilar hypertrophy or exudate.  Erythema of the posterior oropharynx.  Swelling of the inferior nasal turbinates bilaterally.  Some clear rhinorrhea.  Tympanic membranes are white with normal bony and light landmarks.  Neck: No adenopathy  Resp: Clear to auscultation bilaterally  CV: Regular rate and rhythm  Abd: Soft, non-tender.  Ext: Warm.    Skin: exposed skin free of rash  Psych: Mood appropriate       Assessment and Plan   1. Anxiety with flying    We discussed the risks of lorazepam including sedation, addiction, difficulty with decision-making, falls, and the significant increase in sedation and falls risk and [place] disorientation if mixed with alcohol.  Patient elected to use lorazepam specifically for flight anxiety despite these risks.  We discussed dosing, timing, and use of this medication for flight anxiety.    For your flight anxiety:    You may use clonazepam 2 mg, take this medication about 1 hour before your flight.    You have used this medication before so recall its effect, and has been effective for you in the past.  Medication is sedating, and can affect judgment, balance, and you should not operate a motor vehicle or travel alone " while using this medication.    Do not mix this medication with alcohol.    Use your other anxiety relieving techniques, reading, meditation, distraction to also help with anxiety.      - clonazePAM (KLONOPIN) 2 MG tablet; Take 2mg by mouth about 1 hour prior to flight  Dispense: 4 tablet; Refill: 0    2. Viral upper respiratory tract infection  No sign of recurrent otitis or bacterial sinus infection at this point.  Symptom management instructions given as well as follow-up instructions.    For your sinus congestion:  Continue to drink plenty of fluids, continue daily physical activity.  You can use Afrin nasal spray 2 sprays to each nostril twice daily for the day prior to your flight, and the day of your flight.  Do not use the nasal spray (Afrin) more than 5 days in a row, as this can cause rebound congestion    You were given an influenza vaccine today      3. Need for prophylactic vaccination and inoculation against influenza  Risks and benefits discussed.  Consented to seasonal influenza vaccine    Follow up: If sinus congestion symptoms have not resolved within 2 to 3 weeks.    Options for treatment and follow-up care were reviewed with the patient and/or guardian. Venice Gaffney and/or guardian engaged in the decision making process and verbalized understanding of the options discussed and agreed with the final plan.    Jesus Cruz MD  Faculty - [facility] Residency Program

## 2024-03-03 NOTE — PATIENT INSTRUCTIONS
For your flight anxiety:    You may use clonazepam 2 mg, take this medication about 1 hour before your flight.    You have used this medication before so recall its effect, and has been effective for you in the past.  Medication is sedating, and can affect judgment, balance, and you should not operate a motor vehicle or travel alone while using this medication.    Do not mix this medication with alcohol.    Use your other anxiety relieving techniques, reading, meditation, distraction to also help with anxiety.    For your sinus congestion:  Continue to drink plenty of fluids, continue daily physical activity.  You can use Afrin nasal spray 2 sprays to each nostril twice daily for the day prior to your flight, and the day of your flight.  Do not use the nasal spray (Afrin) more than 5 days in a row, as this can cause rebound congestion    You are given an influenza vaccine today    Nice to see you today, have a great vacation in [place]!

## 2024-03-09 NOTE — ANESTHESIA PREPROCEDURE EVALUATION
Anesthesia Evaluation      Patient summary reviewed   No history of anesthetic complications     Airway   Mallampati: II  Neck ROM: full   Pulmonary - normal exam   (+) a smoker                         Cardiovascular - negative ROS and normal exam   Neuro/Psych    (+) anxiety/panic attacks,     Comments: Migraines      Endo/Other - negative ROS      GI/Hepatic/Renal - negative ROS           Dental - normal exam                        Anesthesia Plan  Planned anesthetic: general endotracheal and total IV anesthesia  Versed/fentanyl/propofol/alvino  Decadron/zofran/scop    Propofol TIVA    ASA 2   Induction: intravenous   Anesthetic plan and risks discussed with: patient and parent/guardian  Anesthesia plan special considerations: antiemetics,   Post-op plan: routine recovery      Results for orders placed or performed during the hospital encounter of 11/15/19   POCT pregnancy, urine ([facility])   Result Value Ref Range    POC Preg, Urine Negative Negative    POCT Kit Lot Number [id number]     POCT Kit Expiration Date 2,020/3     Pos Control Valid Control Valid Control    Neg Control Valid Control Valid Control    Dipstick Lot Number      Dipstick Expiration Date      POC Specific Gravity, Urine     Hemoglobin POCT (not required for cataract patients)   Result Value Ref Range    Hgb 15.4 7.0 g/dL          You can access the FollowMyHealth Patient Portal offered by [facility] by registering at the following website: [email or web address]. By joining [employer]’s FollowMyHealth portal, you will also be able to view your health information using other applications (apps) compatible with our system.

## 2024-03-19 ENCOUNTER — VIRTUAL VISIT (OUTPATIENT)
Dept: SLEEP MEDICINE | Facility: CLINIC | Age: 39
End: 2024-03-19
Payer: COMMERCIAL

## 2024-03-19 VITALS — BODY MASS INDEX: 34.41 KG/M2 | WEIGHT: 187 LBS | HEIGHT: 62 IN

## 2024-03-19 DIAGNOSIS — G47.36 HYPOXEMIA ASSOCIATED WITH SLEEP: ICD-10-CM

## 2024-03-19 DIAGNOSIS — G47.33 OSA (OBSTRUCTIVE SLEEP APNEA): Primary | ICD-10-CM

## 2024-03-19 PROCEDURE — 99213 OFFICE O/P EST LOW 20 MIN: CPT | Mod: 95 | Performed by: NURSE PRACTITIONER

## 2024-03-19 ASSESSMENT — SLEEP AND FATIGUE QUESTIONNAIRES
HOW LIKELY ARE YOU TO NOD OFF OR FALL ASLEEP WHILE WATCHING TV: HIGH CHANCE OF DOZING
HOW LIKELY ARE YOU TO NOD OFF OR FALL ASLEEP WHILE SITTING AND TALKING TO SOMEONE: WOULD NEVER DOZE
HOW LIKELY ARE YOU TO NOD OFF OR FALL ASLEEP WHILE LYING DOWN TO REST IN THE AFTERNOON WHEN CIRCUMSTANCES PERMIT: HIGH CHANCE OF DOZING
HOW LIKELY ARE YOU TO NOD OFF OR FALL ASLEEP WHILE SITTING INACTIVE IN A PUBLIC PLACE: SLIGHT CHANCE OF DOZING
HOW LIKELY ARE YOU TO NOD OFF OR FALL ASLEEP WHILE SITTING QUIETLY AFTER LUNCH WITHOUT ALCOHOL: WOULD NEVER DOZE
HOW LIKELY ARE YOU TO NOD OFF OR FALL ASLEEP IN A CAR, WHILE STOPPED FOR A FEW MINUTES IN TRAFFIC: WOULD NEVER DOZE
HOW LIKELY ARE YOU TO NOD OFF OR FALL ASLEEP WHILE SITTING AND READING: HIGH CHANCE OF DOZING
HOW LIKELY ARE YOU TO NOD OFF OR FALL ASLEEP WHEN YOU ARE A PASSENGER IN A CAR FOR AN HOUR WITHOUT A BREAK: MODERATE CHANCE OF DOZING

## 2024-03-19 ASSESSMENT — PAIN SCALES - GENERAL: PAINLEVEL: NO PAIN (0)

## 2024-03-19 NOTE — PATIENT INSTRUCTIONS
"MY INFORMATION ON SLEEP APNEA-  Venice Gaffney    DOCTOR : MARY Sam CNP  SLEEP CENTER :      MY CONTACT NUMBER:   [facility]  [phone]  [facility]   [phone]  [facility]   [phone]      [facility]  [phone]  [facility]   [phone]    DME:  [facility] [place] [address], [address]  [place] [address]  Phone: [phone]    Hours:  Mon - Fri: 8:00 a.m. - 4:30 p.m.  Sat: Closed  Sun: Closed        Key Points:  1. What is Obstructive Sleep Apnea (NADEEM)? NADEEM is the most common type of sleep apnea. Apnea literally means, \"without breath.\" It is characterized by repetitive pauses in breathing, despite continued effort to breathe, and is usually associated with a reduction in blood oxygen saturation. Apneas can last 10 to over 60 seconds. It is caused by narrowing or collapse of the upper airway as muscles relax during sleep.   2. What are the consequences of NADEEM? Symptoms include: daytime sleepiness- possibly increasing the risk of falling asleep while driving, unrefreshing/restless sleep, snoring, insomnia, waking frequently to urinate, waking with heartburn or reflux, reduced concentration and memory, and morning headaches. Other health consequences may include development of high blood pressure. Untreated NADEEM also can contribute to heart disease, stroke and diabetes.   3. What are the treatment options? In most situations, sleep apnea is a lifelong disease that must be managed with daily therapy. Continuous Positive Airway (CPAP) is the most reliable treatment. A mouthguard to hold your jaw forward is usually the next most reliable option. Other options include postioning devices (to keep you off your back), nasal valves, tongue retaining device, weight loss, surgery. There is more detail about these options toward the end of this " document.  4. What are the most important things to remember about using CPAP?     WHERE CAN I FIND MORE INFORMATION?    American Academy of Sleep Medicine Patient information on sleep disorders:  http://yoursleep.aasmnet.org    CPAP -  WHY AND HOW?                 Continuous positive airway pressure, or CPAP, is the most effective treatment for obstructive sleep apnea. It works by blowing room air, through a mask, to hold your throat open. A decision to use CPAP is a major step forward in the pursuit of a healthier life. The successful use of CPAP will help you breathe easier, sleep better and live healthier. Using CPAP can be a positive experience if you keep these oneil points in mind:  Commitment  CPAP is not a quick fix for your problem. It involves a long-term commitment to improve your sleep and your health.    Communication  Stay in close communication with both your sleep doctor and your CPAP supplier. Ask lots of questions and seek help when you need it.    Consistency  Use CPAP all night, every night and for every nap. You will receive the maximum health benefits from CPAP when you use it every time that you sleep. This will also make it easier for your body to adjust to the treatment.    Correction  The first machine and mask that you try may not be the best ones for you. Work with your sleep doctor and your CPAP supplier to make corrections to your equipment selection. Ask about trying a different type of machine or mask if you have ongoing problems. Make sure that your mask is a good fit and learn to use your equipment properly.    Challenge  Tell a family member or close friend to ask you each morning if you used your CPAP the previous night. Have someone to challenge you to give it your best effort.    Connection   Your adjustment to CPAP will be easier if you are able to connect with others who use the same treatment. Ask your sleep doctor if there is a support group in your area for people who have  "sleep apnea, or look for one on the Internet.  Comfort   Increase your level of comfort by using a saline spray, decongestant or heated humidifier if CPAP irritates your nose, mouth or throat. Use your unit's \"ramp\" setting to slowly get used to the air pressure level. There may be soft pads you can buy that will fit over your mask straps. Look on www.CPAP.com for accessories that can help make CPAP use more comfortable.  Cleaning   Clean your mask, tubing and headgear on a regular basis. Put this time in your schedule so that you don't forget to do it. Check and replace the filters for your CPAP unit and humidifier.    Completion   Although you are never finished with CPAP therapy, you should reward yourself by celebrating the completion of your first month of treatment. Expect this first month to be your hardest period of adjustment. It will involve some trial and error as you find the machine, mask and pressure settings that are right for you.    Continuation  After your first month of treatment, continue to make a daily commitment to use your CPAP all night, every night and for every nap.    CPAP-Tips to starting with success:  Begin using your CPAP for short periods of time during the day while you watch TV or read.    Use CPAP every night and for every nap. Using it less often reduces the health benefits and makes it harder for your body to get used to it.    Newer CPAP models are virtually silent; however, if you find the sound of your CPAP machine to be bothersome, place the unit under your bed to dampen the sound.     Make small adjustments to your mask, tubing, straps and headgear until you get the right fit. Tightening the mask may actually worsen the leak.  If it leaves significant marks on your face or irritates the bridge of your nose, it may not be the best mask for you.  Speak with the person who supplied the mask and consider trying other masks. Insurances will allow you to try different masks " during the first month of starting CPAP.  Insurance also covers a new mask, hose and filter about every 6 months.    Use a saline nasal spray to ease mild nasal congestion. Neti-Pot or saline nasal rinses may also help. Nasal gel sprays can help reduce nasal dryness.  Biotene mouthwash can be helpful to protect your teeth if you experience frequent dry mouth.  Dry mouth may be a sign of air escaping out of your mouth or out of the mask in the case of a full face mask.  Speak with your provider if you expect that is the case.     Take a nasal decongestant to relieve more severe nasal or sinus congestion.  Do not use Afrin (oxymetazoline) nasal spray more than 3 days in a row.  Speak with your sleep doctor if your nasal congestion is chronic.    Use a heated humidifier that fits your CPAP model to enhance your breathing comfort. Adjust the heat setting up if you get a dry nose or throat, down if you get condensation in the hose or mask.  Position the CPAP lower than you so that any condensation in the hose drains back into the machine rather than towards the mask.    Try a system that uses nasal pillows if traditional masks give you problems.    Clean your mask, tubing and headgear once a week. Make sure the equipment dries fully.    Regularly check and replace the filters for your CPAP unit and humidifier.    Work closely with your sleep provider and your CPAP supplier to make sure that you have the machine, mask and air pressure setting that works best for you. It is better to stop using it and call your provider to solve problems than to lay awake all night frustrated with the device.  Weight Loss:    Weight loss decreases severity of sleep apnea in most people with obesity. For those with mild obesity who have developed snoring with weight gain, even 15-30 pound weight loss can improve and occasionally eliminate sleep apnea.  Structured and life-long dietary and health habits are necessary to lose weight and keep  healthier weight levels.     Though there are significant health benefits from weight loss, long-term weight loss is very difficult to achieve- studies show success with dietary management in less than 10% of people. In addition, substantial weight loss may require years of dietary control and may be difficult if patients have severe obesity. In these cases, surgical management may be considered.    If you are interested in methods for weight loss, you should review the options discussed at the National Institutes of Health patient information sites:     http://win.niddk.nih.gov/publications/index.htm  http:/www.health.nih.gov/topic/WeightLossDieting    Bariatric programs offer counseling in all methods of weight loss:    Http:/www.[facility].org/Specialties/WeightLossSurgeryandMedicalMgmt/htm    Your BMI is Body mass index is 34.2 kg/m .    Weight management plan: Patient was referred to their PCP to discuss a diet and exercise plan.    Body mass index (BMI) is one way to tell whether you are at a healthy weight, overweight, or obese. It measures your weight in relation to your height.  A BMI of 18.5 to 24.9 is in the healthy range. A person with a BMI of 25 to 29.9 is considered overweight, and someone with a BMI of 30 or greater is considered obese.  Another way to find out if you are at risk for health problems caused by overweight and obesity is to measure your waist. If you are a woman and your waist is more than 35 inches, or if you are a man and your waist is more than 40 inches, your risk of disease may be higher.  More than two-thirds of American adults are considered overweight or obese. Being overweight or obese increases the risk for further weight gain.  Excess weight may lead to heart disease and diabetes. Creating and following plans for healthy eating and physical activity may help you improve your health.    Methods for maintaining or losing weight.    Weight control is part of healthy  lifestyle and includes exercise, emotional health, and healthy eating habits.  Careful eating habits lifelong is the mainstay of weight control.  Though there are significant health benefits from weight loss, long-term weight loss with diet alone may be very difficult to achieve- studies show long-term success with dietary management in less than 10% of people. Attaining a healthy weight may be especially difficult to achieve in those with severe obesity. In some cases, medications, devices and surgical management might be considered.    What can you do?    If you are overweight or obese and are interested in methods for weight loss, you should discuss this with your provider. In addition, we recommend that you review healthy life styles and methods for weight loss available through the National Institutes of Health patient information sites:     http://win.niddk.nih.gov/publications/index.htm

## 2024-03-19 NOTE — NURSING NOTE
Is the patient currently in the state of [place]? YES    Visit mode:VIDEO    If the visit is dropped, the patient can be reconnected by: VIDEO VISIT: Send to e-mail at: [email or web address]    Will anyone else be joining the visit? NO  (If patient encounters technical issues they should call [phone]    How would you like to obtain your AVS? MyChart    Are changes needed to the allergy or medication list? Pt stated no changes to allergies and Pt stated no med changes    Reason for visit: RECHECK  Has patient had flu shot for current/most recent flu season? If so, when? Yes: 06/26/2024    [employer] IGLESIAS

## 2024-03-19 NOTE — PROGRESS NOTES
"Virtual Visit Details    Type of service:  Video Visit   Video Start Time: 1:03 PM  Video End Time:1:23 PM    Originating Location (pt. Location): Other Work    Distant Location (provider location):  Off-site  Platform used for Video Visit: [employer]      Home Sleep Apnea Testing Results Visit:    Chief Complaint   Patient presents with    RECHECK       Venice Gaffney is a 38 year old female with PMH pertinent for migraines, nicotine dependence, anxiety with flying, history of depression, and obesity who returns to [facility] after having had Home Sleep Apnea Testing.  She presented with symptoms suggestive of obstructive sleep apnea.    Estimated body mass index is 34.2 kg/m  as calculated from the following:    Height as of this encounter: 1.575 m (5' 2\").    Weight as of this encounter: 84.8 kg (187 lb).    Total score - Porter: 12 (3/19/2024 12:49 PM)   Total Score: 4 (12/28/2023 12:50 PM)  PATRCIIA Total Score: 19    Home Sleep Apnea Testing - 12/28/2023: Weight: 190 lbs    Analysis Time:  439.6 minutes      Respiration:   Sleep Associated Hypoxemia: sustained hypoxemia was present. Baseline oxygen saturation was 96%.  Time with saturation less than or equal to 88% was 27 minutes. The lowest oxygen saturation was 73%.   Snoring: Snoring was present. Snoring was reported as moderate.   Respiratory events: The home study revealed a presence of 104 obstructive apneas and 1 mixed and central apneas. There were 194 hypopneas resulting in a combined apnea/hypopnea index [AHI] of 40.8 events per hour.  AHI was 54.2 per hour supine, NA per hour prone, 65.6 per hour on left side, and 15.6 per hour on right side.   Pattern: Excluding events noted above, respiratory rate and pattern was Normal.     Position: Percent of time spent: supine - 56%, prone - 0%, on left - 8%, on right - 37%.     Heart Rate: By pulse oximetry tachycardia was noted.      Assessment:   Severe obstructive sleep apnea with AHI 40.8 " "events per hour.  Sleep associated hypoxemia was present.      Overall signal quality was fair    Signal quality of Oxymeter 100.0% Good  Nasal Cannula 87.7% Fair  RIP belts 100.0% Good.     Venice Gaffney reports that she slept Fair .     Home Sleep Apnea Testing was reviewed in detail today with Venice and a copy given to her for her records.    Past medical/surgical history, family history, social history, medications and allergies were reviewed.    Patient Active Problem List   Diagnosis    Migraines    Anxiety with flying    Smoker    Diverticulitis of colon    Hypertrophy of labia minora    Obesity    NADEEM (obstructive sleep apnea)    Class 2 severe obesity due to excess calories with serious comorbidity in adult (H)     Current Outpatient Medications   Medication    azelastine (ASTELIN) 0.1 % nasal spray    cetirizine (ZYRTEC) 10 MG tablet    clonazePAM (KLONOPIN) 2 MG tablet    EPINEPHrine (ANY BX GENERIC EQUIV) 0.3 MG/0.3ML injection 2-pack    fluticasone (FLONASE) 50 MCG/ACT nasal spray    ORDER FOR ALLERGEN IMMUNOTHERAPY    oxymetazoline (AFRIN) 0.05 % nasal spray     No current facility-administered medications for this visit.       Ht 1.575 m (5' 2\")   Wt 84.8 kg (187 lb)   BMI 34.20 kg/m      Impression/Plan:  Severe Obstructive Sleep Apnea.   Sleep associated hypoxemia was present.  - Comprehensive DME    Treatment options discussed today including  auto-CPAP at 5-15 cmH2O or polysomnography with full night PAP titration.    Elected treatment with auto-CPAP at 5-15 cmH2O. A comprehensive DME order was placed for new APAP device, mask of choice, and supplies sent to [facility], today.  We also discussed usual use/compliance requirements associated with new PAP device therapy.    Follow-up in approximately 2 months after obtaining new APAP device to review download data and use/compliance.    25 minutes spent with patient with >50% spent in counseling, chart " review/documentation, and coordination of care on the date of the encounter.      MARY Sam CNP  Sleep Medicine      CC:  Jesus Cruz,     This note was written with the assistance of the Dragon voice-dictation technology software. The final document, although reviewed, may contain errors. For corrections, please contact the office.

## 2024-03-27 ENCOUNTER — DOCUMENTATION ONLY (OUTPATIENT)
Dept: SLEEP MEDICINE | Facility: CLINIC | Age: 39
End: 2024-03-27
Payer: COMMERCIAL

## 2024-03-27 DIAGNOSIS — G47.33 OBSTRUCTIVE SLEEP APNEA: Primary | ICD-10-CM

## 2024-03-27 NOTE — PROGRESS NOTES
Patient was offered choice of vendor and chose [facility].  Patient Venice Gaffney was set up at [place] on March 27, 2024. Patient received a Resmed Airsense 10 Pressures were set at  5-15 cm H2O.   Patient s ramp is 5 cm H2O for Auto and FLEX/EPR is EPR, 2.  Patient received a Resmed Mask name: [id number]  Pillow mask size Small, heated tubing and heated humidifier.  Patient has the following compliance requirements: none. Sam O Humes

## 2024-04-01 ENCOUNTER — DOCUMENTATION ONLY (OUTPATIENT)
Dept: SLEEP MEDICINE | Facility: CLINIC | Age: 39
End: 2024-04-01
Payer: COMMERCIAL

## 2024-04-01 NOTE — PROGRESS NOTES
3 day Sleep therapy management telephone visit    Diagnostic AHI:  40.8 HST    Confirmed with patient at time of call- N/A Patient is still interested in STM service       Message left for patient to return call.        Objective data     Order Settings for PAP  CPAP min     CPAP max              Device settings from machine CPAP min 5.0     CPAP max 15.0           EPR Setting TWO    RESMED soft response  OFF     Assessment: Nightly usage over four hours      Action plan: Patient to have 14 day STM visit. Patient has a follow up visit scheduled:   no    Replacement device: No  STM ordered by provider: Yes     Total time spent on accessing and  interpreting remote patient PAP therapy data  10 minutes    Total time spent counseling, coaching  and reviewing PAP therapy data with patient  1 minutes    [id number] no

## 2024-04-04 ENCOUNTER — DOCUMENTATION ONLY (OUTPATIENT)
Dept: SLEEP MEDICINE | Facility: CLINIC | Age: 39
End: 2024-04-04
Payer: COMMERCIAL

## 2024-04-04 NOTE — PROGRESS NOTES
STM Recheck:  Patient having stuffy nose due to Spring allergies.  Patient is wondering if she should get a Full Face mask.  Patient feeling the benefit of CPAP.  She is dreaming more has eliminated her snoring per her boyfriend. Patient stated everything going well with CPAP.

## 2024-04-11 ENCOUNTER — DOCUMENTATION ONLY (OUTPATIENT)
Dept: SLEEP MEDICINE | Facility: CLINIC | Age: 39
End: 2024-04-11
Payer: COMMERCIAL

## 2024-04-11 NOTE — PROGRESS NOTES
14  DAY STM VISIT    Diagnostic AHI:  HST: 40.8        Message left for patient to return call.    Assessment: Pt meeting objective benchmarks.       Action plan: waiting for patient to return call.  and pt to have 30 day STM visit.      Device type: Auto-CPAP    PAP settings: CPAP min 5.0 cm  H20       CPAP max 15.0 cm  H20      95th% pressure 14.7 cm  H20        RESMED EPR level Setting: TWO    RESMED Soft response setting:  OFF    Mask type:  Per patient choice    Objective measures: 14 day rolling measures      Compliance  100 %      Leak  4.64  lpm  last  upload      AHI 1.83   last  upload      Average number of minutes 473      Objective measure goal  Compliance   Goal >70%  Leak   Goal < 24 lpm  AHI  Goal < 5  Usage  Goal >240    Patient has the following upcoming sleep appts:      Total time spent on accessing and interpreting remote patient PAP therapy data  10 minutes    Total time spent counseling, coaching  and reviewing PAP therapy data with patient  1 minutes    [id number]  [id number]  no (3 day STM)

## 2024-06-11 NOTE — NURSING NOTE
DME orders have been automatically faxed to [facility].  My Chart message sent to patient:               Venice Gaffney  [address] [facility] [address]          06/11/2024    Dear Ms. Gaffney,                This is a follow-up message regarding your new CPAP device. Please call and schedule a New CPAP follow-up appointment with MARY Sam CNP.         [facility]   Schedule line: [phone]      Sincerely,                Opal GLEZ CMA  [facility]

## 2024-06-17 ENCOUNTER — PATIENT OUTREACH (OUTPATIENT)
Dept: CARE COORDINATION | Facility: CLINIC | Age: 39
End: 2024-06-17
Payer: COMMERCIAL

## 2024-06-17 NOTE — PROGRESS NOTES
Clinic Care Coordination Contact  Follow Up Progress Note      Assessment:  The pt was recently in the ED, I called to check up on the pt and help the pt setup a ED follow up. I called  the pt, but got her vm, so I left a vm for the pt to give me a call back.     Care Gaps:    Health Maintenance Due   Topic Date Due    ADVANCE CARE PLANNING  Never done    YEARLY PREVENTIVE VISIT  03/01/2002    NICOTINE/TOBACCO CESSATION COUNSELING Q 1 YR  10/15/2022           Care Plans      Intervention/Education provided during outreach:               Plan:     Care Coordinator will follow up in

## 2024-06-18 ENCOUNTER — PATIENT OUTREACH (OUTPATIENT)
Dept: CARE COORDINATION | Facility: CLINIC | Age: 39
End: 2024-06-18
Payer: COMMERCIAL

## 2024-06-19 ENCOUNTER — PATIENT OUTREACH (OUTPATIENT)
Dept: CARE COORDINATION | Facility: CLINIC | Age: 39
End: 2024-06-19
Payer: COMMERCIAL

## 2024-07-07 ENCOUNTER — HEALTH MAINTENANCE LETTER (OUTPATIENT)
Age: 39
End: 2024-07-07

## 2024-11-03 SDOH — HEALTH STABILITY: PHYSICAL HEALTH: ON AVERAGE, HOW MANY MINUTES DO YOU ENGAGE IN EXERCISE AT THIS LEVEL?: 50 MIN

## 2024-11-03 SDOH — HEALTH STABILITY: PHYSICAL HEALTH: ON AVERAGE, HOW MANY DAYS PER WEEK DO YOU ENGAGE IN MODERATE TO STRENUOUS EXERCISE (LIKE A BRISK WALK)?: 5 DAYS

## 2024-11-03 ASSESSMENT — SOCIAL DETERMINANTS OF HEALTH (SDOH): HOW OFTEN DO YOU GET TOGETHER WITH FRIENDS OR RELATIVES?: ONCE A WEEK

## 2024-11-04 ENCOUNTER — OFFICE VISIT (OUTPATIENT)
Dept: FAMILY MEDICINE | Facility: CLINIC | Age: 39
End: 2024-11-04
Payer: COMMERCIAL

## 2024-11-04 VITALS
WEIGHT: 193 LBS | OXYGEN SATURATION: 98 % | RESPIRATION RATE: 18 BRPM | DIASTOLIC BLOOD PRESSURE: 80 MMHG | HEIGHT: 61 IN | SYSTOLIC BLOOD PRESSURE: 125 MMHG | TEMPERATURE: 98.8 F | HEART RATE: 87 BPM | BODY MASS INDEX: 36.44 KG/M2

## 2024-11-04 DIAGNOSIS — E66.01 CLASS 2 SEVERE OBESITY DUE TO EXCESS CALORIES WITH SERIOUS COMORBIDITY AND BODY MASS INDEX (BMI) OF 35.0 TO 35.9 IN ADULT (H): Primary | ICD-10-CM

## 2024-11-04 DIAGNOSIS — G47.33 OSA (OBSTRUCTIVE SLEEP APNEA): ICD-10-CM

## 2024-11-04 DIAGNOSIS — E66.812 CLASS 2 SEVERE OBESITY DUE TO EXCESS CALORIES WITH SERIOUS COMORBIDITY AND BODY MASS INDEX (BMI) OF 35.0 TO 35.9 IN ADULT (H): Primary | ICD-10-CM

## 2024-11-04 DIAGNOSIS — Z80.3 FAMILY HISTORY OF MALIGNANT NEOPLASM OF BREAST: ICD-10-CM

## 2024-11-04 SDOH — HEALTH STABILITY: PHYSICAL HEALTH: ON AVERAGE, HOW MANY MINUTES DO YOU ENGAGE IN EXERCISE AT THIS LEVEL?: 50 MIN

## 2024-11-04 SDOH — HEALTH STABILITY: PHYSICAL HEALTH: ON AVERAGE, HOW MANY DAYS PER WEEK DO YOU ENGAGE IN MODERATE TO STRENUOUS EXERCISE (LIKE A BRISK WALK)?: 5 DAYS

## 2024-11-04 ASSESSMENT — ANXIETY QUESTIONNAIRES
8. IF YOU CHECKED OFF ANY PROBLEMS, HOW DIFFICULT HAVE THESE MADE IT FOR YOU TO DO YOUR WORK, TAKE CARE OF THINGS AT HOME, OR GET ALONG WITH OTHER PEOPLE?: NOT DIFFICULT AT ALL
GAD7 TOTAL SCORE: 8
IF YOU CHECKED OFF ANY PROBLEMS ON THIS QUESTIONNAIRE, HOW DIFFICULT HAVE THESE PROBLEMS MADE IT FOR YOU TO DO YOUR WORK, TAKE CARE OF THINGS AT HOME, OR GET ALONG WITH OTHER PEOPLE: NOT DIFFICULT AT ALL
4. TROUBLE RELAXING: MORE THAN HALF THE DAYS
7. FEELING AFRAID AS IF SOMETHING AWFUL MIGHT HAPPEN: NOT AT ALL
1. FEELING NERVOUS, ANXIOUS, OR ON EDGE: MORE THAN HALF THE DAYS
2. NOT BEING ABLE TO STOP OR CONTROL WORRYING: SEVERAL DAYS
5. BEING SO RESTLESS THAT IT IS HARD TO SIT STILL: SEVERAL DAYS
GAD7 TOTAL SCORE: 8
3. WORRYING TOO MUCH ABOUT DIFFERENT THINGS: SEVERAL DAYS
7. FEELING AFRAID AS IF SOMETHING AWFUL MIGHT HAPPEN: NOT AT ALL
6. BECOMING EASILY ANNOYED OR IRRITABLE: SEVERAL DAYS
GAD7 TOTAL SCORE: 8

## 2024-11-04 ASSESSMENT — SOCIAL DETERMINANTS OF HEALTH (SDOH): HOW OFTEN DO YOU GET TOGETHER WITH FRIENDS OR RELATIVES?: ONCE A WEEK

## 2024-11-04 NOTE — PROGRESS NOTES
Preventive Care Visit  [facility]  Jesus Cruz MD, Family Medicine  Nov 4, 2024  {Provider  Link to SmartSet :792783}    {PROVIDER CHARTING PREFERENCE:055306}    Flores Millard is a 39 year old, presenting for the following:  Physical, Weight  (Would like to lose weight ), and Mammgram         11/4/2024     2:13 PM   Additional Questions   Roomed by Lisseth metz         11/4/2024    Information    services provided? No             HPI  ***  {MA/LPN/RN Pre-Provider Visit Orders- hCG/UA/Strep (Optional):390794}  {SUPERLIST (Optional):365831}  {additonal problems for provider to add (Optional):329753}  Health Care Directive  Patient does not have a Health Care Directive: {ADVANCE_DIRECTIVE_STATUS:558880}      11/4/2024   General Health   How would you rate your overall physical health? (!) FAIR   Feel stress (tense, anxious, or unable to sleep) To some extent      (!) STRESS CONCERN      11/4/2024   Nutrition   Three or more servings of calcium each day? Yes   Diet: Regular (no restrictions)   How many servings of fruit and vegetables per day? 4 or more   How many sweetened beverages each day? 0-1            11/4/2024   Exercise   Days per week of moderate/strenous exercise 5 days   Average minutes spent exercising at this level 50 min            11/4/2024   Social Factors   Frequency of gathering with friends or relatives Once a week   Worry food won't last until get money to buy more No   Food not last or not have enough money for food? No   Do you have housing? (Housing is defined as stable permanent housing and does not include staying ouside in a car, in a tent, in an abandoned building, in an overnight shelter, or couch-surfing.) Yes   Are you worried about losing your housing? No   Lack of transportation? No   Unable to get utilities (heat,electricity)? No            11/4/2024   Dental   Dentist two times every year? (!) NO               Today's  "PHQ-2 Score:       11/4/2024     2:07 PM   PHQ-2 ( 1999 [employer])   Q1: Little interest or pleasure in doing things 0    Q2: Feeling down, depressed or hopeless 1    PHQ-2 Score 1    Q1: Little interest or pleasure in doing things Not at all   Q2: Feeling down, depressed or hopeless Several days   PHQ-2 Score 1       Patient-reported           11/4/2024   Substance Use   Alcohol more than 3/day or more than 7/wk No   Do you use any other substances recreationally? No        Social History     Tobacco Use    Smoking status: Every Day     Types: Other, Vaping Device    Smokeless tobacco: Current    Tobacco comments:     Vaping   Vaping Use    Vaping status: Every Day   Substance Use Topics    Alcohol use: Yes     Alcohol/week: 0.8 standard drinks of alcohol     Types: 1 Standard drinks or equivalent per week     Comment: 1-2 times per month    Drug use: No     {Provider  If there are gaps in the social history shown above, please follow the link to update and then refresh the note Link to Social and Substance History :744058}     {Mammogram Decision Support (Optional):715045}        11/4/2024   STI Screening   New sexual partner(s) since last STI/HIV test? No        History of abnormal Pap smear: { :638749}        Latest Ref Rng & Units 5/18/2023    11:02 AM   PAP / HPV   PAP  Negative for Intraepithelial Lesion or Malignancy (NILM)    HPV 16 DNA Negative Negative    HPV 18 DNA Negative Negative    Other HR HPV Negative Negative        {Provider  REQUIRED FOR AWV Use the storyboard to review patient history, after sections have been marked as reviewed, refresh note to capture documentation:423045}   Reviewed and updated as needed this visit by Provider                    {HISTORY OPTIONS (Optional):594303}    {ROS Picklists (Optional):468404}     Objective    Exam  /80   Pulse 87   Temp 98.8  F (37.1  C) (Tympanic)   Resp 18   Ht 1.555 m (5' 1.22\")   Wt 87.5 kg (193 lb)   SpO2 98%   BMI 36.21 kg/m   " "  Estimated body mass index is 36.21 kg/m  as calculated from the following:    Height as of this encounter: 1.555 m (5' 1.22\").    Weight as of this encounter: 87.5 kg (193 lb).    Physical Exam  {Exam Choices (Optional):348039}        Signed Electronically by: Jesus Cruz MD  {Email feedback regarding this note to [email or web address]   :307426}  Answers submitted by the patient for this visit:  Patient Health Questionnaire (G7) (Submitted on 11/4/2024)  SANJUANA 7 TOTAL SCORE: 8    " (193 lb).    Physical Exam  General: Alert in no distress  HEENT: Head is dorsum of and atraumatic eyes clear nontender noninjected.  Moist mucous membranes.  Tympanic membranes are white with normal bony and light landmarks  Cardiovascular: Regular rate rhythm without murmur  Respiratory: Lungs are clear to auscultation bilaterally  Abdomen soft nontender  Extremities are warm and free of edema        Signed Electronically by: Jesus Cruz MD    Answers submitted by the patient for this visit:  Patient Health Questionnaire (G7) (Submitted on 11/4/2024)  SANJUANA 7 TOTAL SCORE: 8

## 2024-11-04 NOTE — PROGRESS NOTES
Prior to immunization administration, verified patients identity using patient s name and date of birth. Please see Immunization Activity for additional information.     Screening Questionnaire for Adult Immunization    Are you sick today?   No   Do you have allergies to medications, food, a vaccine component or latex?   No   Have you ever had a serious reaction after receiving a vaccination?   No   Do you have a long-term health problem with heart, lung, kidney, or metabolic disease (e.g., diabetes), asthma, a blood disorder, no spleen, complement component deficiency, a cochlear implant, or a spinal fluid leak?  Are you on long-term aspirin therapy?   No   Do you have cancer, leukemia, HIV/AIDS, or any other immune system problem?   No   Do you have a parent, brother, or sister with an immune system problem?   No   In the past 3 months, have you taken medications that affect  your immune system, such as prednisone, other steroids, or anticancer drugs; drugs for the treatment of rheumatoid arthritis, Crohn s disease, or psoriasis; or have you had radiation treatments?   No   Have you had a seizure, or a brain or other nervous system problem?   No   During the past year, have you received a transfusion of blood or blood    products, or been given immune (gamma) globulin or antiviral drug?   No   For women: Are you pregnant or is there a chance you could become       pregnant during the next month?   No   Have you received any vaccinations in the past 4 weeks?   No     Immunization questionnaire answers were all negative.      Patient instructed to remain in clinic for 15 minutes afterwards, and to report any adverse reactions.     Screening performed by Lisseth Mueller on 11/4/2024 at 2:26 PM.

## 2024-12-02 ENCOUNTER — OFFICE VISIT (OUTPATIENT)
Dept: FAMILY MEDICINE | Facility: CLINIC | Age: 39
End: 2024-12-02
Payer: COMMERCIAL

## 2024-12-02 VITALS
BODY MASS INDEX: 34.93 KG/M2 | SYSTOLIC BLOOD PRESSURE: 117 MMHG | OXYGEN SATURATION: 97 % | TEMPERATURE: 97.8 F | DIASTOLIC BLOOD PRESSURE: 78 MMHG | HEART RATE: 73 BPM | HEIGHT: 61 IN | RESPIRATION RATE: 20 BRPM | WEIGHT: 185 LBS

## 2024-12-02 DIAGNOSIS — G47.33 OSA (OBSTRUCTIVE SLEEP APNEA): ICD-10-CM

## 2024-12-02 DIAGNOSIS — E66.01 CLASS 2 SEVERE OBESITY DUE TO EXCESS CALORIES WITH SERIOUS COMORBIDITY AND BODY MASS INDEX (BMI) OF 35.0 TO 35.9 IN ADULT (H): ICD-10-CM

## 2024-12-02 DIAGNOSIS — E66.812 CLASS 2 SEVERE OBESITY DUE TO EXCESS CALORIES WITH SERIOUS COMORBIDITY AND BODY MASS INDEX (BMI) OF 35.0 TO 35.9 IN ADULT (H): ICD-10-CM

## 2024-12-02 LAB
EST. AVERAGE GLUCOSE BLD GHB EST-MCNC: 123 MG/DL
HBA1C MFR BLD: 5.9 % (ref 0–5.6)

## 2024-12-02 PROCEDURE — 36415 COLL VENOUS BLD VENIPUNCTURE: CPT | Performed by: FAMILY MEDICINE

## 2024-12-02 PROCEDURE — 83036 HEMOGLOBIN GLYCOSYLATED A1C: CPT | Performed by: FAMILY MEDICINE

## 2024-12-02 PROCEDURE — 99214 OFFICE O/P EST MOD 30 MIN: CPT | Performed by: FAMILY MEDICINE

## 2024-12-02 NOTE — LETTER
December 9, 2024      Venice Gaffney  [address] [facility] [address]        Dear MsLeland Yeimy,    We are writing to inform you of your test results.    Your blood sugar test shows your blood sugars are running slightly above average, but NOT in the diabetes range.   The medication you started Wegovy will help reduce your average blood sugar levels.   Continue Wegovy and see Dr. Cruz in 4 weeks as scheduled.     Resulted Orders   Hemoglobin A1c   Result Value Ref Range    Estimated Average Glucose 123 (H) <117 mg/dL    Hemoglobin A1C 5.9 (H) 0.0 - 5.6 %      Comment:      Normal <5.7%   Prediabetes 5.7-6.4%    Diabetes 6.5% or higher     Note: Adopted from ADA consensus guidelines.       If you have any questions or concerns, please call the clinic at the number listed above.       Sincerely,      Jesus Cruz MD

## 2024-12-02 NOTE — PATIENT INSTRUCTIONS
Increase your Wegovy dose to 0.5 mg once a week.    If you experience nausea vomiting or other side effects please call and let Dr. Cruz know.    Otherwise return to clinic in 4 weeks for another dose adjustment.    Today you had a diabetes screening test called a hemoglobin A1c.  This test evaluates your 3-month blood sugar average.  Results should be available later today via [employer].    See you in about 4 weeks!

## 2024-12-02 NOTE — PROGRESS NOTES
"(AFRIN) 0.05 % nasal spray Spray 0.2 mLs (2 sprays) into both nostrils 2 times daily 15 mL 0    Semaglutide-Weight Management (WEGOVY) 0.5 MG/0.5ML pen Inject 0.5 mg subcutaneously once a week. 2 mL 0       Social History     Tobacco Use    Smoking status: Every Day     Types: Other, Vaping Device    Smokeless tobacco: Current    Tobacco comments:     Vaping   Vaping Use    Vaping status: Every Day    Substances: Nicotine, Flavoring    Devices: Disposable   Substance Use Topics    Alcohol use: Yes     Alcohol/week: 0.8 standard drinks of alcohol     Types: 1 Standard drinks or equivalent per week     Comment: 1-2 times per month    Drug use: No       Social History     Social History Narrative    Not on file       Allergies   Allergen Reactions    Sumatriptan Anaphylaxis and Other (See Comments)     Passing out  Passing out      Cat Hair Extract Other (See Comments)     Other reaction(s): Sneezing  Watery eyes, sneezing, itching  Watery eyes, sneezing, itching      Demerol [Meperidine]      Passing out    Meperidine And Related Other (See Comments)     syncope  Passed out  Passing out  Passing out      Seasonal Allergies Headache and Other (See Comments)     Other reaction(s): Sneezing    Seafood Rash and Hives       No results found for this or any previous visit (from the past 24 hours).         Review of Systems:      ROS: 10 point ROS neg other than the symptoms noted above in the HPI.           Physical Exam:     Vitals:    12/02/24 0958   BP: 117/78   BP Location: Right arm   Patient Position: Sitting   Cuff Size: Adult Regular   Pulse: 73   Resp: 20   Temp: 97.8  F (36.6  C)   TempSrc: Oral   SpO2: 97%   Weight: 83.9 kg (185 lb)   Height: 1.56 m (5' 1.42\")     Body mass index is 34.48 kg/m .    General: Alert,   in no acute distress  HEENT: Head is free of trauma.   Sclerae non-icteric. PERRL, Moist oral mucus membranes  RESP: Clear to auscultation bilaterally  CV: Regular rate and rhythm  Abd: Soft, " non-tender.  Ext: Warm.    Skin: exposed skin free of rash  Psych: Mood appropriate       Assessment and Plan   1. Class 2 severe obesity due to excess calories with serious comorbidity and body mass index (BMI) of 35.0 to 35.9 in adult (H)  Doing well with the induction dose of Wegovy at 0.25 mg weekly.    We discussed titrating up the medication to 0.5 mg, and she is amenable.  She is very educated on the medication and its side effects, and has done her own research.    Will increase Wegovy to 0.5 mg once weekly and return to clinic in 1 month for further dose titration.    She will return earlier if she develops any new symptoms or concerns.  - Semaglutide-Weight Management (WEGOVY) 0.5 MG/0.5ML pen; Inject 0.5 mg subcutaneously once a week.  Dispense: 2 mL; Refill: 0  - Hemoglobin A1c; Future  - Hemoglobin A1c    2. NADEEM (obstructive sleep apnea)  Continue CPAP  She has been successful with weight loss so far, and hopeful with continued success with Wegovy and ongoing dietary, physical activity, and self perception changes      Follow up: 1 month  Options for treatment and follow-up care were reviewed with the patient and/or guardian. Venice Gaffney and/or guardian engaged in the decision making process and verbalized understanding of the options discussed and agreed with the final plan.    Jesus Cruz MD  Faculty - [facility]

## 2024-12-03 NOTE — RESULT ENCOUNTER NOTE
Your blood sugar test shows your blood sugars are running slightly above average, but NOT in the diabetes range.  The medication you started Wegovy will help reduce your average blood sugar levels.  Continue Wejaskaranvy and see Dr. Cruz in 4 weeks as scheduled.

## 2024-12-09 ENCOUNTER — TELEPHONE (OUTPATIENT)
Dept: FAMILY MEDICINE | Facility: CLINIC | Age: 39
End: 2024-12-09
Payer: COMMERCIAL

## 2024-12-09 NOTE — TELEPHONE ENCOUNTER
Results Call      Reason for Call:  I gave patient results and she stated she does not need multiple calls as she has already also seen it via [employer]. Will route to team color fyi, patient has received results no further questions. Thank you      Date of last appointment with provider:     Could we send this information to you in [employer] or would you prefer to receive a phone call?:   No preference   Okay to leave a detailed message?: N/A at Other phone number:

## 2024-12-30 ENCOUNTER — OFFICE VISIT (OUTPATIENT)
Dept: FAMILY MEDICINE | Facility: CLINIC | Age: 39
End: 2024-12-30
Payer: COMMERCIAL

## 2024-12-30 VITALS
SYSTOLIC BLOOD PRESSURE: 125 MMHG | TEMPERATURE: 98.3 F | HEIGHT: 61 IN | DIASTOLIC BLOOD PRESSURE: 84 MMHG | OXYGEN SATURATION: 97 % | BODY MASS INDEX: 33.79 KG/M2 | RESPIRATION RATE: 22 BRPM | HEART RATE: 83 BPM | WEIGHT: 179 LBS

## 2024-12-30 DIAGNOSIS — E66.01 CLASS 2 SEVERE OBESITY DUE TO EXCESS CALORIES WITH SERIOUS COMORBIDITY AND BODY MASS INDEX (BMI) OF 35.0 TO 35.9 IN ADULT (H): ICD-10-CM

## 2024-12-30 DIAGNOSIS — E66.812 CLASS 2 SEVERE OBESITY DUE TO EXCESS CALORIES WITH SERIOUS COMORBIDITY AND BODY MASS INDEX (BMI) OF 35.0 TO 35.9 IN ADULT (H): ICD-10-CM

## 2024-12-30 NOTE — PROGRESS NOTES
HPI:   Venice Gaffney is a 39 year old  female who presents for:    Chief Complaint   Patient presents with    RECHECK     Weight management        She tolerated the increase Wegovy dose from 0.25 mg up to 0.5 mg 1 month ago without much problem.  She did have some nausea during the first week.  She had some intermittent tingling on the side of her face during that first week as well which made her worry it would be a sustained side effect of the Wegovy.  It resolved.  She did not have any difficulty with vision, hearing, motor function, speech or thinking.    She continues to stay physically active and try to make healthy diet choices.    Over the past week she had a number of holiday celebrations.         PMHX:     Patient Active Problem List   Diagnosis    Migraines    Anxiety with flying    Smoker    Diverticulitis of colon    Hypertrophy of labia minora    Obesity    NADEEM (obstructive sleep apnea)    Class 2 severe obesity due to excess calories with serious comorbidity in adult (H)       Current Outpatient Medications   Medication Sig Dispense Refill    Semaglutide-Weight Management (WEGOVY) 1 MG/0.5ML pen Inject 1 mg subcutaneously once a week. 2 mL 0    azelastine (ASTELIN) 0.1 % nasal spray Spray 2 sprays into both nostrils 2 times daily 30 mL 3    cetirizine (ZYRTEC) 10 MG tablet Take 1 tablet (10 mg) by mouth daily 90 tablet 3    clonazePAM (KLONOPIN) 2 MG tablet Take 2mg by mouth about 1 hour prior to flight 4 tablet 0    EPINEPHrine (ANY BX GENERIC EQUIV) 0.3 MG/0.3ML injection 2-pack Inject into outer thigh for allergic reaction 4 each 0    fluticasone (FLONASE) 50 MCG/ACT nasal spray Spray 1 spray into both nostrils daily 9.9 mL 3    ORDER FOR ALLERGEN IMMUNOTHERAPY Vaccine A MOLD/ INDOORALLERGENS/ RAGWEED  DFM Df Mite D farinae 10,000 AU, 0.5 ml  DPM Dp Mite D pteronyssinus. 10,000 AU, 0.5 ml  Vaccine B POLLENS/ CAT  CAT Cat Hair 10,000 BAU 2.0 ml  GABRIELE Gabriele, White Fraxinus Americana 1:20 w/v,  "0.5 ml  Dilutions: None (red) Frequency: weekly  1/10 (yellow) Frequency: weekly  1/100 (blue) Frequency: weekly  1/1000 (green) Frequency: weekly      Diluent: HSA qs to 5ml 5 mL 4    oxymetazoline (AFRIN) 0.05 % nasal spray Spray 0.2 mLs (2 sprays) into both nostrils 2 times daily 15 mL 0    Semaglutide-Weight Management (WEGOVY) 0.5 MG/0.5ML pen Inject 0.5 mg subcutaneously once a week. 2 mL 0       Social History     Tobacco Use    Smoking status: Every Day     Types: Other, Vaping Device    Smokeless tobacco: Current    Tobacco comments:     Vaping   Vaping Use    Vaping status: Every Day    Substances: Nicotine, Flavoring    Devices: Disposable   Substance Use Topics    Alcohol use: Yes     Alcohol/week: 0.8 standard drinks of alcohol     Types: 1 Standard drinks or equivalent per week     Comment: 1-2 times per month    Drug use: No       Social History     Social History Narrative    Not on file       Allergies   Allergen Reactions    Sumatriptan Anaphylaxis and Other (See Comments)     Passing out  Passing out      Cat Hair Extract Other (See Comments)     Other reaction(s): Sneezing  Watery eyes, sneezing, itching  Watery eyes, sneezing, itching      Demerol [Meperidine]      Passing out    Meperidine And Related Other (See Comments)     syncope  Passed out  Passing out  Passing out      Seasonal Allergies Headache and Other (See Comments)     Other reaction(s): Sneezing    Seafood Rash and Hives       No results found for this or any previous visit (from the past 24 hours).         Review of Systems:     No fevers or recent illness  ENT: No upper respiratory symptoms         Physical Exam:     Vitals:    12/30/24 1503   BP: 125/84   BP Location: Right arm   Patient Position: Sitting   Pulse: 83   Resp: 22   Temp: 98.3  F (36.8  C)   TempSrc: Oral   SpO2: 97%   Weight: 81.2 kg (179 lb)   Height: 1.555 m (5' 1.22\")     Body mass index is 33.58 kg/m .    General: Alert,  in no acute distress  HEENT: Head is " free of trauma.   Sclerae non-icteric. PERRL, Moist oral mucus membranes, no tonsilar hypertrophy or exudate. landmarks.  Resp: Clear to auscultation bilaterally  CV: Regular rate and rhythm  Abd: Soft, non-tender.  Ext: Warm.    Skin: exposed skin free of rash  Psych: Mood appropriate   Neurologic: Cranial nerves II through XII are intact.      Assessment and Plan   1. Class 2 severe obesity due to excess calories with serious comorbidity and body mass index (BMI) of 35.0 to 35.9 in adult (H)  She is losing weight as she intended on the Wegovy  Tolerating the medication well with some transient side effects during the first week with dose increase.    She elects to continue to increase the dose, and will increase to 1 mg weekly.    Continue to monitor for side effects    Continue to make increasingly healthy dietary and exercise choices    Return to clinic in 4 weeks, earlier as needed.  - Semaglutide-Weight Management (WEGOVY) 1 MG/0.5ML pen; Inject 1 mg subcutaneously once a week.  Dispense: 2 mL; Refill: 0      Follow up: 4 weeks  Options for treatment and follow-up care were reviewed with the patient and/or guardian. Venice Gaffney and/or guardian engaged in the decision making process and verbalized understanding of the options discussed and agreed with the final plan.    The longitudinal plan of care for the diagnosis/condition as documented was addressed during this visit. Due to the added complexity in care, I will continue to support [place] in the subsequent management and with ongoing continuity of care.      Jesus Cruz MD  Faculty - [facility]

## 2024-12-30 NOTE — PATIENT INSTRUCTIONS
Increase your dose of Wegovy from 0.5 mg up to 1 mg weekly    As you have experienced before you might have some nausea particularly during that first week.    Keep up the good work with making healthy diet choices, healthy exercise choices.    Return to clinic in 4 weeks.

## 2025-01-27 ENCOUNTER — OFFICE VISIT (OUTPATIENT)
Dept: FAMILY MEDICINE | Facility: CLINIC | Age: 40
End: 2025-01-27
Payer: COMMERCIAL

## 2025-01-27 VITALS
SYSTOLIC BLOOD PRESSURE: 116 MMHG | DIASTOLIC BLOOD PRESSURE: 79 MMHG | TEMPERATURE: 97.4 F | BODY MASS INDEX: 33.24 KG/M2 | OXYGEN SATURATION: 96 % | HEART RATE: 86 BPM | HEIGHT: 61 IN | RESPIRATION RATE: 16 BRPM | WEIGHT: 176.04 LBS

## 2025-01-27 DIAGNOSIS — E66.01 CLASS 2 SEVERE OBESITY DUE TO EXCESS CALORIES WITH SERIOUS COMORBIDITY AND BODY MASS INDEX (BMI) OF 35.0 TO 35.9 IN ADULT (H): Primary | ICD-10-CM

## 2025-01-27 DIAGNOSIS — E66.812 CLASS 2 SEVERE OBESITY DUE TO EXCESS CALORIES WITH SERIOUS COMORBIDITY AND BODY MASS INDEX (BMI) OF 35.0 TO 35.9 IN ADULT (H): Primary | ICD-10-CM

## 2025-01-27 NOTE — PATIENT INSTRUCTIONS
Begin your new dose of Wegovy 1.7 mg weekly    Call return to clinic with new concerns or questions    Continue to add healthy dietary changes and increase physical activity routines.    Take advantage of that new gym membership and your work building!    Otherwise follow-up in clinic in 4 weeks for your next Wegovy evaluation

## 2025-01-27 NOTE — PROGRESS NOTES
HPI:   Venice Gaffney is a 39 year old  female who presents for:    Chief Complaint   Patient presents with    Weight Loss     She just completed her second month of Wegovy.  She has been using 1 mg weekly.  With the initial dose increase she had some nausea for the first week.  This improved after the first week.  She did not have any vomiting or other noted side effect.    She is trying to make some more dietary modifications.    Her employer has offered a free gym membership in the gym is right in her office building.  She hopes to exercise more regularly at this gym.    She has lost a couple more pounds.  She is encouraged by the weight loss on Wegovy.  In the past she has not been able to have consistent success with weight loss despite diet and exercise changes.    She would like to go on the increased dose of Wegovy.         PMHX:     Patient Active Problem List   Diagnosis    Migraines    Anxiety with flying    Smoker    Diverticulitis of colon    Hypertrophy of labia minora    Obesity    NADEEM (obstructive sleep apnea)    Class 2 severe obesity due to excess calories with serious comorbidity in adult (H)       Current Outpatient Medications   Medication Sig Dispense Refill    azelastine (ASTELIN) 0.1 % nasal spray Spray 2 sprays into both nostrils 2 times daily 30 mL 3    cetirizine (ZYRTEC) 10 MG tablet Take 1 tablet (10 mg) by mouth daily 90 tablet 3    clonazePAM (KLONOPIN) 2 MG tablet Take 2mg by mouth about 1 hour prior to flight 4 tablet 0    EPINEPHrine (ANY BX GENERIC EQUIV) 0.3 MG/0.3ML injection 2-pack Inject into outer thigh for allergic reaction 4 each 0    fluticasone (FLONASE) 50 MCG/ACT nasal spray Spray 1 spray into both nostrils daily 9.9 mL 3    ORDER FOR ALLERGEN IMMUNOTHERAPY Vaccine A MOLD/ INDOORALLERGENS/ RAGWEED  DFM Df Mite D farinae 10,000 AU, 0.5 ml  DPM Dp Mite D pteronyssinus. 10,000 AU, 0.5 ml  Vaccine B POLLENS/ CAT  CAT Cat Hair 10,000 BAU 2.0 ml  GABRIELE Gabriele, White  "Fraxinus Americana 1:20 w/v, 0.5 ml  Dilutions: None (red) Frequency: weekly  1/10 (yellow) Frequency: weekly  1/100 (blue) Frequency: weekly  1/1000 (green) Frequency: weekly      Diluent: HSA qs to 5ml 5 mL 4    oxymetazoline (AFRIN) 0.05 % nasal spray Spray 0.2 mLs (2 sprays) into both nostrils 2 times daily 15 mL 0    Semaglutide-Weight Management (WEGOVY) 1.7 MG/0.75ML pen Inject 1.7 mg subcutaneously once a week. 3 mL 0       Social History     Tobacco Use    Smoking status: Every Day     Types: Other, Vaping Device     Passive exposure: Current    Smokeless tobacco: Current    Tobacco comments:     Vaping   Vaping Use    Vaping status: Every Day    Substances: Nicotine, Flavoring    Devices: Disposable   Substance Use Topics    Alcohol use: Yes     Alcohol/week: 0.8 standard drinks of alcohol     Types: 1 Standard drinks or equivalent per week     Comment: 1-2 times per month    Drug use: No       Social History     Social History Narrative    Not on file       Allergies   Allergen Reactions    Sumatriptan Anaphylaxis and Other (See Comments)     Passing out  Passing out      Cat Dander Other (See Comments)     Other reaction(s): Sneezing  Watery eyes, sneezing, itching  Watery eyes, sneezing, itching      Demerol [Meperidine]      Passing out    Meperidine And Related Other (See Comments)     syncope  Passed out  Passing out  Passing out      Seasonal Allergies Headache and Other (See Comments)     Other reaction(s): Sneezing    Seafood Rash and Hives       No results found for this or any previous visit (from the past 24 hours).         Review of Systems:     No vomiting  No abdominal pain           Physical Exam:     Vitals:    01/27/25 1515   BP: 116/79   BP Location: Right arm   Patient Position: Sitting   Cuff Size: Adult Regular   Pulse: 86   Resp: 16   Temp: 97.4  F (36.3  C)   TempSrc: Oral   SpO2: 96%   Weight: 79.9 kg (176 lb 0.6 oz)   Height: 1.558 m (5' 1.34\")     Body mass index is 32.9 " kg/m .    General: Alert, in no acute distress  HEENT: Head is free of trauma.   Sclerae non-icteric. Moist oral mucus membranes, no tonsilar hypertrophy or exudate.   Resp: Clear to auscultation bilaterally  CV: Regular rate and rhythm  Abd: Soft, non-tender.  Ext: Warm.    Skin: exposed skin free of rash  Psych: Mood appropriate       Assessment and Plan   1. Class 2 severe obesity due to excess calories with serious comorbidity and body mass index (BMI) of 35.0 to 35.9 in adult (H) (Primary)  Continues to have weight loss success with Wegovy  Tolerating dose increases  She elects to continue to increase the dose of Wegovy    Will increase Wegovy to 1.7 mg weekly  Return to clinic in 4 weeks    Take advantage of the gym membership and increase daily physical activity  Continue to make dietary changes    See patient instructions    - Semaglutide-Weight Management (WEGOVY) 1.7 MG/0.75ML pen; Inject 1.7 mg subcutaneously once a week.  Dispense: 3 mL; Refill: 0      Follow up: 4-weeks  Options for treatment and follow-up care were reviewed with the patient and/or guardian. Venice Gaffney and/or guardian engaged in the decision making process and verbalized understanding of the options discussed and agreed with the final plan.    Jesus Cruz MD  Faculty - [facility] Medicine Residency Program      The longitudinal plan of care for the diagnosis(es)/condition(s) as documented were addressed during this visit. Due to the added complexity in care, I will continue to support Venice in the subsequent management and with ongoing continuity of care.

## 2025-02-17 ENCOUNTER — OFFICE VISIT (OUTPATIENT)
Dept: FAMILY MEDICINE | Facility: CLINIC | Age: 40
End: 2025-02-17
Payer: COMMERCIAL

## 2025-02-17 VITALS
WEIGHT: 172.08 LBS | BODY MASS INDEX: 32.49 KG/M2 | DIASTOLIC BLOOD PRESSURE: 79 MMHG | HEIGHT: 61 IN | RESPIRATION RATE: 20 BRPM | TEMPERATURE: 98 F | HEART RATE: 74 BPM | SYSTOLIC BLOOD PRESSURE: 135 MMHG | OXYGEN SATURATION: 97 %

## 2025-02-17 DIAGNOSIS — F40.243 ANXIETY WITH FLYING: ICD-10-CM

## 2025-02-17 DIAGNOSIS — E66.812 CLASS 2 SEVERE OBESITY DUE TO EXCESS CALORIES WITH SERIOUS COMORBIDITY AND BODY MASS INDEX (BMI) OF 35.0 TO 35.9 IN ADULT (H): Primary | ICD-10-CM

## 2025-02-17 DIAGNOSIS — E66.01 CLASS 2 SEVERE OBESITY DUE TO EXCESS CALORIES WITH SERIOUS COMORBIDITY AND BODY MASS INDEX (BMI) OF 35.0 TO 35.9 IN ADULT (H): Primary | ICD-10-CM

## 2025-02-17 RX ORDER — CLONAZEPAM 2 MG/1
TABLET ORAL
Qty: 4 TABLET | Refills: 0 | Status: SHIPPED | OUTPATIENT
Start: 2025-02-17

## 2025-02-17 NOTE — PROGRESS NOTES
HPI:   Venice Gaffney is a 39 year old  female who presents for:    Chief Complaint   Patient presents with    Follow Up     Follow up on medication.      He has been using Wegovy for weight management.  She has lost about 23 pounds since starting.  She is currently on the 1.7 mg dose.  The first time she took this though she had some nausea for about a week.  This improved.  Over the weekend she had some constipation then loose stool which resolved.  Other coworkers had similar loose stool, and she assumes it was a viral infection.  No blood in the stool, black stool, fevers or abdominal pain.    She continues to do weekly meal preparation and eats mainly fresh foods and unprocessed foods.  She has had decreased appetite which she thinks is contributing significantly to her weight loss.  She feels more energetic and feels her mood has been good.    She is not experiencing any injection site reactions or skin reactions.    Flight anxiety: She flies to [place] each spring.  She has flight anxiety.  She uses Klonopin 2 mg for flights.  She sometimes has connecting flights so in the past having 4 tablets available has been helpful.  She requests a refill on the Klonopin  She has not had difficulty with sedation, dizziness, lightheadedness, balance problems or other noted side effects.  We discussed the potential for sedation, falls, balance difficulty, and even addiction with longer term of this medication, the fact that is a controlled substance, should be transported in the labeled prescription bottle.         PMHX:     Patient Active Problem List   Diagnosis    Migraines    Anxiety with flying    Smoker    Diverticulitis of colon    Hypertrophy of labia minora    Obesity    NADEEM (obstructive sleep apnea)    Class 2 severe obesity due to excess calories with serious comorbidity in adult (H)       Current Outpatient Medications   Medication Sig Dispense Refill    clonazePAM (KLONOPIN) 2 MG tablet Take 2mg by  mouth about 1 hour prior to flight 4 tablet 0    Semaglutide-Weight Management (WEGOVY) 2.4 MG/0.75ML pen Inject 2.4 mg subcutaneously once a week. 3 mL 2    azelastine (ASTELIN) 0.1 % nasal spray Spray 2 sprays into both nostrils 2 times daily 30 mL 3    cetirizine (ZYRTEC) 10 MG tablet Take 1 tablet (10 mg) by mouth daily 90 tablet 3    EPINEPHrine (ANY BX GENERIC EQUIV) 0.3 MG/0.3ML injection 2-pack Inject into outer thigh for allergic reaction 4 each 0    fluticasone (FLONASE) 50 MCG/ACT nasal spray Spray 1 spray into both nostrils daily 9.9 mL 3    ORDER FOR ALLERGEN IMMUNOTHERAPY Vaccine A MOLD/ INDOORALLERGENS/ RAGWEED  DFM Df Mite D farinae 10,000 AU, 0.5 ml  DPM Dp Mite D pteronyssinus. 10,000 AU, 0.5 ml  Vaccine B POLLENS/ CAT  CAT Cat Hair 10,000 BAU 2.0 ml  GABRIELE Gabriele, White Fraxinus Americana 1:20 w/v, 0.5 ml  Dilutions: None (red) Frequency: weekly  1/10 (yellow) Frequency: weekly  1/100 (blue) Frequency: weekly  1/1000 (green) Frequency: weekly      Diluent: HSA qs to 5ml 5 mL 4    oxymetazoline (AFRIN) 0.05 % nasal spray Spray 0.2 mLs (2 sprays) into both nostrils 2 times daily 15 mL 0       Social History     Tobacco Use    Smoking status: Every Day     Types: Other, Vaping Device     Passive exposure: Current    Smokeless tobacco: Current    Tobacco comments:     Vaping   Vaping Use    Vaping status: Every Day    Substances: Nicotine, Flavoring    Devices: Disposable   Substance Use Topics    Alcohol use: Yes     Alcohol/week: 0.8 standard drinks of alcohol     Types: 1 Standard drinks or equivalent per week     Comment: 1-2 times per month    Drug use: No       Social History     Social History Narrative    Not on file       Allergies   Allergen Reactions    Sumatriptan Anaphylaxis and Other (See Comments)     Passing out  Passing out      Cat Dander Other (See Comments)     Other reaction(s): Sneezing  Watery eyes, sneezing, itching  Watery eyes, sneezing, itching      Demerol [Meperidine]       "Passing out    Meperidine And Related Other (See Comments)     syncope  Passed out  Passing out  Passing out      Seasonal Allergies Headache and Other (See Comments)     Other reaction(s): Sneezing    Seafood Rash and Hives       No results found for this or any previous visit (from the past 24 hours).         Review of Systems:     No fevers or upper respiratory symptoms  See HPI             Physical Exam:     Vitals:    02/17/25 1507   BP: 135/79   Pulse: 74   Resp: 20   Temp: 98  F (36.7  C)   TempSrc: Oral   SpO2: 97%   Weight: 78.1 kg (172 lb 1.3 oz)   Height: 1.555 m (5' 1.22\")     Body mass index is 32.28 kg/m .    General: Alert,  in no acute distress  HEENT: Head is free of trauma.   Sclerae non-icteric. Moist oral mucus membranes  Resp: Clear to auscultation bilaterally  CV: Regular rate and rhythm  Skin: exposed skin free of rash  Psych: Mood appropriate       Assessment and Plan   1. Class 2 severe obesity due to excess calories with serious comorbidity and body mass index (BMI) of 35.0 to 35.9 in adult (H) (Primary)    About 23 pounds of weight loss since starting Wegovy  She has increased energy, and is very happy with her progress.  She is making healthy dietary choices.  She is increased her physical activity and he is using the workplace based gym 3 days a week now.    She would like to increase the Wegovy dose to 2.4 mg weekly.  We discussed potential side effects, nausea, vomiting, skin reactions      - Semaglutide-Weight Management (WEGOVY) 2.4 MG/0.75ML pen; Inject 2.4 mg subcutaneously once a week.  Dispense: 3 mL; Refill: 2    2. Anxiety with flying  Overall anxiety has improved significantly over the past couple years.    She still has situational anxiety with flying.  She has found clonazepam 2 mg helpful for flights.  She is flying to [place] in a few weeks and may have some connecting flights.    We discussed the risks of clonazepam as outlined in the HPI, and she consented to treatment " despite the risks.  She will not mix this medication with alcohol, and will be with a travel  for the entirety of her flight and trip.    - clonazePAM (KLONOPIN) 2 MG tablet; Take 2mg by mouth about 1 hour prior to flight  Dispense: 4 tablet; Refill: 0      Follow up: 2 months  Options for treatment and follow-up care were reviewed with the patient and/or guardian. Venice Gaffney and/or guardian engaged in the decision making process and verbalized understanding of the options discussed and agreed with the final plan.    The longitudinal plan of care for the diagnosis(es)/condition(s) as documented were addressed during this visit. Due to the added complexity in care, I will continue to support [place] in the subsequent management and with ongoing continuity of care.      Jesus Cruz MD  Faculty - [facility] Medicine Residency Program

## 2025-04-14 ENCOUNTER — DOCUMENTATION ONLY (OUTPATIENT)
Dept: SLEEP MEDICINE | Facility: CLINIC | Age: 40
End: 2025-04-14
Payer: COMMERCIAL

## 2025-04-14 DIAGNOSIS — G47.33 OBSTRUCTIVE SLEEP APNEA: Primary | ICD-10-CM

## 2025-05-13 DIAGNOSIS — E66.812 CLASS 2 SEVERE OBESITY DUE TO EXCESS CALORIES WITH SERIOUS COMORBIDITY AND BODY MASS INDEX (BMI) OF 35.0 TO 35.9 IN ADULT (H): ICD-10-CM

## 2025-05-13 DIAGNOSIS — E66.01 CLASS 2 SEVERE OBESITY DUE TO EXCESS CALORIES WITH SERIOUS COMORBIDITY AND BODY MASS INDEX (BMI) OF 35.0 TO 35.9 IN ADULT (H): ICD-10-CM

## 2025-05-13 RX ORDER — SEMAGLUTIDE 2.4 MG/.75ML
INJECTION, SOLUTION SUBCUTANEOUS
Qty: 3 ML | Refills: 0 | Status: SHIPPED | OUTPATIENT
Start: 2025-05-13

## 2025-05-17 ENCOUNTER — HEALTH MAINTENANCE LETTER (OUTPATIENT)
Age: 40
End: 2025-05-17

## 2025-06-10 DIAGNOSIS — E66.812 CLASS 2 SEVERE OBESITY DUE TO EXCESS CALORIES WITH SERIOUS COMORBIDITY AND BODY MASS INDEX (BMI) OF 35.0 TO 35.9 IN ADULT (H): ICD-10-CM

## 2025-06-10 DIAGNOSIS — E66.01 CLASS 2 SEVERE OBESITY DUE TO EXCESS CALORIES WITH SERIOUS COMORBIDITY AND BODY MASS INDEX (BMI) OF 35.0 TO 35.9 IN ADULT (H): ICD-10-CM

## 2025-06-11 RX ORDER — SEMAGLUTIDE 2.4 MG/.75ML
INJECTION, SOLUTION SUBCUTANEOUS
Qty: 3 ML | Refills: 0 | Status: SHIPPED | OUTPATIENT
Start: 2025-06-11

## 2025-07-07 ENCOUNTER — OFFICE VISIT (OUTPATIENT)
Dept: FAMILY MEDICINE | Facility: CLINIC | Age: 40
End: 2025-07-07
Payer: COMMERCIAL

## 2025-07-07 VITALS
HEIGHT: 62 IN | RESPIRATION RATE: 18 BRPM | HEART RATE: 89 BPM | OXYGEN SATURATION: 100 % | TEMPERATURE: 98.8 F | SYSTOLIC BLOOD PRESSURE: 114 MMHG | DIASTOLIC BLOOD PRESSURE: 72 MMHG | BODY MASS INDEX: 29.86 KG/M2 | WEIGHT: 162.25 LBS

## 2025-07-07 DIAGNOSIS — K21.00 GASTROESOPHAGEAL REFLUX DISEASE WITH ESOPHAGITIS WITHOUT HEMORRHAGE: Primary | ICD-10-CM

## 2025-07-07 DIAGNOSIS — M89.8X1 PAIN OF LEFT SCAPULA: ICD-10-CM

## 2025-07-07 DIAGNOSIS — Z13.6 SCREENING FOR CARDIOVASCULAR CONDITION: ICD-10-CM

## 2025-07-07 LAB
ALBUMIN SERPL BCG-MCNC: 4.4 G/DL (ref 3.5–5.2)
ALP SERPL-CCNC: 51 U/L (ref 40–150)
ALT SERPL W P-5'-P-CCNC: 15 U/L (ref 0–50)
ANION GAP SERPL CALCULATED.3IONS-SCNC: 11 MMOL/L (ref 7–15)
AST SERPL W P-5'-P-CCNC: 23 U/L (ref 0–45)
BILIRUB SERPL-MCNC: 0.6 MG/DL
BUN SERPL-MCNC: 13.8 MG/DL (ref 6–20)
CALCIUM SERPL-MCNC: 9.4 MG/DL (ref 8.8–10.4)
CHLORIDE SERPL-SCNC: 105 MMOL/L (ref 98–107)
CHOLEST SERPL-MCNC: 135 MG/DL
CREAT SERPL-MCNC: 0.92 MG/DL (ref 0.51–0.95)
EGFRCR SERPLBLD CKD-EPI 2021: 80 ML/MIN/1.73M2
ERYTHROCYTE [DISTWIDTH] IN BLOOD BY AUTOMATED COUNT: 12.2 % (ref 10–15)
FASTING STATUS PATIENT QL REPORTED: ABNORMAL
FASTING STATUS PATIENT QL REPORTED: NORMAL
GLUCOSE SERPL-MCNC: 97 MG/DL (ref 70–99)
HCO3 SERPL-SCNC: 22 MMOL/L (ref 22–29)
HCT VFR BLD AUTO: 39 % (ref 35–47)
HDLC SERPL-MCNC: 40 MG/DL
HGB BLD-MCNC: 12.9 G/DL (ref 11.7–15.7)
LDLC SERPL CALC-MCNC: 72 MG/DL
LIPASE SERPL-CCNC: 77 U/L (ref 13–60)
MCH RBC QN AUTO: 29.6 PG (ref 26.5–33)
MCHC RBC AUTO-ENTMCNC: 33.1 G/DL (ref 31.5–36.5)
MCV RBC AUTO: 89 FL (ref 78–100)
NONHDLC SERPL-MCNC: 95 MG/DL
PLATELET # BLD AUTO: 260 10E3/UL (ref 150–450)
POTASSIUM SERPL-SCNC: 4.1 MMOL/L (ref 3.4–5.3)
PROT SERPL-MCNC: 7.1 G/DL (ref 6.4–8.3)
RBC # BLD AUTO: 4.36 10E6/UL (ref 3.8–5.2)
SODIUM SERPL-SCNC: 138 MMOL/L (ref 135–145)
TRIGL SERPL-MCNC: 115 MG/DL
WBC # BLD AUTO: 8.7 10E3/UL (ref 4–11)

## 2025-07-07 RX ORDER — OMEPRAZOLE 20 MG/1
20 CAPSULE, DELAYED RELEASE ORAL 2 TIMES DAILY
Qty: 60 CAPSULE | Refills: 1 | Status: SHIPPED | OUTPATIENT
Start: 2025-07-07

## 2025-07-07 NOTE — PATIENT INSTRUCTIONS
The pain at your left scapula when you eat or drink is most likely due to irritation of your esophagus, gastroesophageal reflux disease with esophagitis    In order to help this heal I recommend taking omeprazole and stomach acid reducing medication 20 mg twice a day.  It is best to take the first dose about 20 minutes before your breakfast if possible.    Continue this medication twice a day for 30 days, if your pain is completely resolved you can stop at that point.  You can also continue to take the famotidine up to twice a day as needed which may also help with the symptoms particularly in the first couple of weeks while you are healing.    Do not take any ibuprofen or other NSAIDs.  I would even avoid Tylenol if possible.  No alcohol.  Avoid eating within 3 hours before lying down or bedtime.  Avoid spicy foods until this is healed.    You should be reevaluated if you develop any dark stools, blood in stools, more pain with eating or swallowing, feels like food getting stuck, any pain with exertion, any shortness of breath, all those things should be reevaluated immediately.    If your pain has not completely resolved within 4 weeks, return to see Dr. Cruz, the neck step will likely be upper endoscopy.    Please return earlier if you have any new concerns or new symptoms

## 2025-07-07 NOTE — PROGRESS NOTES
"  Assessment & Plan     Gastroesophageal reflux disease with esophagitis without hemorrhage  Symptoms exacerbated by swallowing seem most consistent with an esophagitis, esophageal injury, ulceration, among other considerations.    She was able to bike 20 miles yesterday without any cardiac or respiratory symptoms so cardiovascular pulmonary disease unlikely.    We discussed pursuing immediate endoscopy versus trial of dietary modification, avoiding alcohol, and twice daily PPI therapy.    Will pursue omeprazole 20 mg twice a day and food and lifestyle modification, and monitor closely.    Follow-up immediately if any worsening, difficulty swallowing, increase in pain, exertional symptoms, signs of bleeding, black stools in the emergency department.    Otherwise anticipate improvement within 1 to 2 weeks, and resolution within a month.    See laboratory evaluation.    See patient instructions      - omeprazole (PRILOSEC) 20 MG DR capsule; Take 1 capsule (20 mg) by mouth 2 times daily.  - CBC with platelets; Future  - Comprehensive metabolic panel; Future  - Lipase; Future  - CBC with platelets  - Comprehensive metabolic panel  - Lipase    Pain of left scapula  Most likely a radiating pain from esophageal source as outlined above.    EKG in the emergency department was normal, and no exertional or cardiac symptoms.      - CBC with platelets; Future  - Comprehensive metabolic panel; Future  - Lipase; Future  - CBC with platelets  - Comprehensive metabolic panel  - Lipase    Screening for cardiovascular condition  Lipid profile today as part of risk factor management    - Lipid panel reflex to direct LDL Non-fasting; Future  - Lipid panel reflex to direct LDL Non-fasting          BMI  Estimated body mass index is 30.16 kg/m  as calculated from the following:    Height as of this encounter: 1.562 m (5' 1.5\").    Weight as of this encounter: 73.6 kg (162 lb 4 oz).   Weight management plan: Wegovy      See patient " "nicolás Millard is a 40 year old, presenting for the following health issues:  Shoulder Pain (Has had pain for 11-12 days. When they eat or drink it hurts more. Pain is always there. Might be acid reflux. No progression from the ER. Went to the ER in [place].)      7/7/2025     8:19 AM   Additional Questions   Roomed by Darian   Accompanied by self         7/7/2025    Information    services provided? No     HPI    She went [place] at a [employer] starting last Thursday 626.  She was sleeping, tent in a air mattress.  She was drinking alcohol more than usual, having 6-8 drinks per day.  She first noticed the onset of left upper scapular pain around Friday 627.  She noticed a constant soreness which she describes as a dull burning at her left medial scapula.  It became significantly worse anytime she tried to eat or drink she have a significant increase in the pain at the same scapular site which she described as a \"sharper, harder, burning pain\".  She sometimes also had an anterior pain at her left chest only when she was eating or drinking.  The pain persisted even after she ate or drank, but at a much more mild level.  There are no other exacerbating factors other than eating or drinking.    She continued to eat and drink normally, no vomiting, loose stool, fevers, blood in the stool, black stool.    She is able to walk and do her normal activities without any chest pain, palpitations, shortness of breath.  [facility] the [employer].    On 629 she went to the emergency department in [facility].  She was evaluated with a EKG which was normal.  She was treated with GI cocktail and antacids.  She was prescribed famotidine which she has been using twice a day for about a week as well as Mylanta 4 times a day.  She has not noticed any significant improvement.  She is still able to eat and drink without food getting stuck, but she has the scapular discomfort " "exacerbation when she eats or drink.    She has been using Tylenol and ibuprofen for the pain.    She rode 20 miles on her bicycle yesterday without any change in the pain, palpitations, shortness of breath.    She does not have a history of pancreatitis, ulceration, stomach surgery,.    She does not take any medications other than Wegovy.    Transition of care management:  The emergency department record was reviewed  Medications reconciled  Added medications were famotidine and Mylanta    Patient was also using over-the-counter Tylenol and ibuprofen          Objective    /72 (BP Location: Right arm, Patient Position: Sitting)   Pulse 89   Temp 98.8  F (37.1  C) (Oral)   Resp 18   Ht 1.562 m (5' 1.5\")   Wt 73.6 kg (162 lb 4 oz)   SpO2 100%   BMI 30.16 kg/m    Body mass index is 30.16 kg/m .  Physical Exam   General: Alert no distress relates her own history  HEENT: Has normocephalic atraumatic  Eyes sclera nonicteric noninjected moist mucous membranes no tonsillar bruits or exudate  Cardiovascular: Regular rate and rhythm without murmur  Respiratory: Clear to auscultation bilaterally  Abdomen: Soft and nontender.  Nontender throughout the abdomen.  No peritoneal signs.  No epigastric tenderness.  No pain in the peristernal area where she identified a \"internal pain when she swallows\"  No pain along the scapula, scapular border.  Musculoskeletal: Full range of motion of the left upper extremity without discomfort.  Full internal rotation, external rotation, flexion and extension of the shoulder, elbow without pain.        Signed Electronically by: Jesus Cruz MD    Results for orders placed or performed in visit on 07/07/25   Lipid panel reflex to direct LDL Non-fasting     Status: Abnormal   Result Value Ref Range    Cholesterol 135 <200 mg/dL    Triglycerides 115 <150 mg/dL    Direct Measure HDL 40 (L) >=50 mg/dL    LDL Cholesterol Calculated 72 <100 mg/dL    Non HDL Cholesterol 95 <130 mg/dL    " Patient Fasting > 8hrs? Unknown     Narrative    Cholesterol  Desirable: < 200 mg/dL  Borderline High: 200 - 239 mg/dL  High: >= 240 mg/dL    Triglycerides  Normal: < 150 mg/dL  Borderline High: 150 - 199 mg/dL  High: 200-499 mg/dL  Very High: >= 500 mg/dL    Direct Measure HDL  Female: >= 50 mg/dL   Male: >= 40 mg/dL    LDL Cholesterol  Desirable: < 100 mg/dL  Above Desirable: 100 - 129 mg/dL   Borderline High: 130 - 159 mg/dL   High:  160 - 189 mg/dL   Very High: >= 190 mg/dL    Non HDL Cholesterol  Desirable: < 130 mg/dL  Above Desirable: 130 - 159 mg/dL  Borderline High: 160 - 189 mg/dL  High: 190 - 219 mg/dL  Very High: >= 220 mg/dL   CBC with platelets     Status: Normal   Result Value Ref Range    WBC Count 8.7 4.0 - 11.0 10e3/uL    RBC Count 4.36 3.80 - 5.20 10e6/uL    Hemoglobin 12.9 11.7 - 15.7 g/dL    Hematocrit 39.0 35.0 - 47.0 %    MCV 89 78 - 100 fL    MCH 29.6 26.5 - 33.0 pg    MCHC 33.1 31.5 - 36.5 g/dL    RDW 12.2 10.0 - 15.0 %    Platelet Count 260 150 - 450 10e3/uL   Comprehensive metabolic panel     Status: None   Result Value Ref Range    Sodium 138 135 - 145 mmol/L    Potassium 4.1 3.4 - 5.3 mmol/L    Carbon Dioxide (CO2) 22 22 - 29 mmol/L    Anion Gap 11 7 - 15 mmol/L    Urea Nitrogen 13.8 6.0 - 20.0 mg/dL    Creatinine 0.92 0.51 - 0.95 mg/dL    GFR Estimate 80 >60 mL/min/1.73m2    Calcium 9.4 8.8 - 10.4 mg/dL    Chloride 105 98 - 107 mmol/L    Glucose 97 70 - 99 mg/dL    Alkaline Phosphatase 51 40 - 150 U/L    AST 23 0 - 45 U/L    ALT 15 0 - 50 U/L    Protein Total 7.1 6.4 - 8.3 g/dL    Albumin 4.4 3.5 - 5.2 g/dL    Bilirubin Total 0.6 <=1.2 mg/dL    Patient Fasting > 8hrs? Unknown    Lipase     Status: Abnormal   Result Value Ref Range    Lipase 77 (H) 13 - 60 U/L         38 minutes in room reviewing emergency department records with the patient, gathering history, physical exam, discussing evaluation and treatment planning

## 2025-07-11 DIAGNOSIS — E66.01 CLASS 2 SEVERE OBESITY DUE TO EXCESS CALORIES WITH SERIOUS COMORBIDITY AND BODY MASS INDEX (BMI) OF 35.0 TO 35.9 IN ADULT (H): ICD-10-CM

## 2025-07-11 DIAGNOSIS — E66.812 CLASS 2 SEVERE OBESITY DUE TO EXCESS CALORIES WITH SERIOUS COMORBIDITY AND BODY MASS INDEX (BMI) OF 35.0 TO 35.9 IN ADULT (H): ICD-10-CM

## 2025-07-14 ENCOUNTER — OFFICE VISIT (OUTPATIENT)
Dept: FAMILY MEDICINE | Facility: CLINIC | Age: 40
End: 2025-07-14
Payer: COMMERCIAL

## 2025-07-14 VITALS
RESPIRATION RATE: 20 BRPM | HEART RATE: 79 BPM | OXYGEN SATURATION: 96 % | TEMPERATURE: 98.6 F | SYSTOLIC BLOOD PRESSURE: 104 MMHG | HEIGHT: 63 IN | BODY MASS INDEX: 27.84 KG/M2 | WEIGHT: 157.12 LBS | DIASTOLIC BLOOD PRESSURE: 69 MMHG

## 2025-07-14 DIAGNOSIS — E66.01 CLASS 2 SEVERE OBESITY DUE TO EXCESS CALORIES WITH SERIOUS COMORBIDITY AND BODY MASS INDEX (BMI) OF 35.0 TO 35.9 IN ADULT (H): ICD-10-CM

## 2025-07-14 DIAGNOSIS — K21.00 GASTROESOPHAGEAL REFLUX DISEASE WITH ESOPHAGITIS WITHOUT HEMORRHAGE: Primary | ICD-10-CM

## 2025-07-14 DIAGNOSIS — E66.812 CLASS 2 SEVERE OBESITY DUE TO EXCESS CALORIES WITH SERIOUS COMORBIDITY AND BODY MASS INDEX (BMI) OF 35.0 TO 35.9 IN ADULT (H): ICD-10-CM

## 2025-07-14 RX ORDER — SEMAGLUTIDE 2.4 MG/.75ML
2.4 INJECTION, SOLUTION SUBCUTANEOUS WEEKLY
Qty: 3 ML | Refills: 3 | Status: SHIPPED | OUTPATIENT
Start: 2025-07-14

## 2025-07-14 RX ORDER — SEMAGLUTIDE 2.4 MG/.75ML
INJECTION, SOLUTION SUBCUTANEOUS
Qty: 3 ML | Refills: 0 | Status: SHIPPED | OUTPATIENT
Start: 2025-07-14 | End: 2025-07-14

## 2025-07-14 NOTE — PROGRESS NOTES
Assessment & Plan     Gastroesophageal reflux disease with esophagitis without hemorrhage  As her symptoms only came on with swallowing, esophagitis or other upper GI tract source radiating to her scapular region seems most likely.  She had a mild elevation of the lipase last week, up to 77, now down to 75  Symptoms improving    Continue to monitor symptoms, anticipate complete resolution over the next 1 to 2 weeks with use of omeprazole daily, and continue diet modifications and avoiding alcohol    Reevaluate if symptoms persist beyond 2 weeks, earlier if develops any new symptoms, difficulty with swallowing, increased pain, chest pain, palpitations, nausea vomiting, fevers or other new concerns      - Lipase; Future  - Lipase    Class 2 severe obesity due to excess calories with serious comorbidity and body mass index (BMI) of 35.0 to 35.9 in adult (H)  She request change of prescription to a new pharmacy due to insurance preference: New prescription sent    - Semaglutide-Weight Management (WEGOVY) 2.4 MG/0.75ML pen; Inject 2.4 mg subcutaneously once a week.    Follow-up as outlined above    Flores Millard is a 40 year old, presenting for the following health issues:  Results (Lab results)      7/14/2025    11:45 AM   Additional Questions   Roomed by kirk obando   Accompanied by self         7/14/2025    Information    services provided? No     HPI  Patient was seen last week with scapular pain with eating and drinking.  She has changed her diet and avoided alcohol over the past week.  Some significant improvement in scapular discomfort over the past week. Only intermittently having scapular discomfort with eating/drinking yesterday, and today no pain with liquids. Has not eaten yet today.    No vomiting. No blood in stool or black stool.  No epigastric pain.  No difficulty swallowing food.    Over the past week cut out alcohol. Cut out caffeine. Low fat. No fast food.    Less  "worried about slightly elevated lipase after our discussion last week and reading online, but would like it rechecked today.    She has been able to ride a bicycle many miles and physically exert herself without causing these scapular symptoms.  No chest pain episodes.  No shortness of breath.    Objective    /69 (BP Location: Right arm, Patient Position: Sitting, Cuff Size: Adult Regular)   Pulse 79   Temp 98.6  F (37  C) (Oral)   Resp 20   Ht 1.595 m (5' 2.8\")   Wt 71.3 kg (157 lb 1.9 oz)   SpO2 96%   BMI 28.01 kg/m    Body mass index is 28.01 kg/m .  Physical Exam   General: Alert no distress  HEENT: Head is normocephalic and atraumatic.  Eyes sclera nonicteric noninjected.  Moist mucous membranes  Cardiovascular: Regular rate and rhythm  Respiratory: Clear to auscultation bilaterally  Abdomen: Nontender throughout.  No epigastric tenderness.  Musculoskeletal: Full range of motion at the shoulders.  Some discomfort with full external rotation of the shoulders, right greater than left.  No pain to palpation along the scapula, and medial scapula borders.  Skin: No skin rash on the back       Results for orders placed or performed in visit on 07/14/25   Lipase     Status: Abnormal   Result Value Ref Range    Lipase 75 (H) 13 - 60 U/L         Signed Electronically by: Jesus Cruz MD        "

## 2025-07-15 LAB — LIPASE SERPL-CCNC: 75 U/L (ref 13–60)

## 2025-07-15 NOTE — PATIENT INSTRUCTIONS
Your symptoms have improved with your dietary modifications and avoiding alcohol    Continue the omeprazole 20 mg daily for 4 weeks    Return for reevaluation if your symptoms have not resolved within the next 2 weeks.    Seek evaluation right away if you have any increase in symptoms, difficulty with eating or drinking, blood in the stool or black stool, nausea or vomiting, abdominal pain, chest pain, or other new concerns.

## 2025-07-21 ENCOUNTER — ANCILLARY PROCEDURE (OUTPATIENT)
Dept: GENERAL RADIOLOGY | Facility: CLINIC | Age: 40
End: 2025-07-21
Attending: FAMILY MEDICINE
Payer: COMMERCIAL

## 2025-07-21 ENCOUNTER — OFFICE VISIT (OUTPATIENT)
Dept: FAMILY MEDICINE | Facility: CLINIC | Age: 40
End: 2025-07-21
Payer: COMMERCIAL

## 2025-07-21 VITALS
HEART RATE: 78 BPM | HEIGHT: 61 IN | SYSTOLIC BLOOD PRESSURE: 116 MMHG | WEIGHT: 158.08 LBS | BODY MASS INDEX: 29.84 KG/M2 | OXYGEN SATURATION: 100 % | RESPIRATION RATE: 20 BRPM | TEMPERATURE: 97.9 F | DIASTOLIC BLOOD PRESSURE: 77 MMHG

## 2025-07-21 DIAGNOSIS — R13.19 OTHER DYSPHAGIA: ICD-10-CM

## 2025-07-21 DIAGNOSIS — M89.8X1 PAIN OF LEFT SCAPULA: ICD-10-CM

## 2025-07-21 DIAGNOSIS — M89.8X1 PAIN OF LEFT SCAPULA: Primary | ICD-10-CM

## 2025-07-21 DIAGNOSIS — R13.10 ODYNOPHAGIA: ICD-10-CM

## 2025-07-21 NOTE — PROGRESS NOTES
Assessment & Plan     Pain of left scapula  3-1/2 weeks of pain radiating to the left scapula when she swallows, liquids greater than solids  Laboratory evaluation has been unremarkable other than a very mildly elevated and stable lipase  Dietary changes, and alcohol abstinence, and PPI have not changed her symptoms    Chest x-ray today is normal    As her symptoms of persisted and the differential diagnosis includes esophageal pathology, esophagitis, ulcer disease, mechanical, space-occupying lesion among others recommend moving to imaging with CT to evaluate the entire upper GI tract and area of discomfort at her scapula as well as upper endoscopy    These evaluations were ordered and the patient will contact our clinic if she is not contacted within the next 48 hours to get these set up    She should return for immediate evaluation if she has any difficulty swallowing, difficulty with breathing, fevers or other new concerns      - XR Chest 2 Views; Future  - CT Chest (PE) Abdomen Pelvis w Contrast; Future  - Adult GI  Referral - Procedure Only; Future    Odynophagia  Pain with swallowing radiating to her left scapula as outlined above    - CT Chest (PE) Abdomen Pelvis w Contrast; Future  - Adult GI  Referral - Procedure Only; Future    Follow-up as soon as imaging available or any change in symptoms    Subjective   [place] is a 40 year old, presenting for the following health issues:  RECHECK (Patient is coming in for back left side pain, pain mostly happens with patient is drinking sometimes eating as well)      7/21/2025     1:19 PM   Additional Questions   Roomed by Rose Samano   Accompanied by Self         7/21/2025    Information    services provided? No     HPI    3 and half weeks of pain in her left subscapular area when she swallows.  This started over 3 weeks ago after being at a weekend long music festival where she was drinking alcohol more than usual.   "Despite being abstinent of alcohol, modifying her diet, and taking a PPI daily for the past 3 weeks her symptoms have not improved.  She continues to have pain at her left subscapular area every time she swallows, worse with liquids than with solids.  No difficulty with food sticking, weight loss, fevers, blood in the stool or black stool.  She does not have a past history of ulcer disease or GI surgery.  She has had problems with diverticulitis in the remote past.    She is worried about what could be causing the pain and the fact that is not improved despite her dietary changes and daily PPI.    She has been on Wegovy, but tolerated this medication without these symptoms for many months.  No other new medications this year.    Objective    /77 (BP Location: Right arm, Patient Position: Sitting, Cuff Size: Adult Regular)   Pulse 78   Temp 97.9  F (36.6  C) (Oral)   Resp 20   Ht 1.549 m (5' 1\")   Wt 71.7 kg (158 lb 1.3 oz)   SpO2 100%   BMI 29.87 kg/m    Body mass index is 29.87 kg/m .  Physical Exam   General: Alert no distress  HEENT: Head is normocephalic and atraumatic.  Eyes sclerae are nonicteric and on injected  Moist mucous membranes  Cardiovascular: Regular rate and rhythm without murmur  Respiratory: Clear to auscultation bilaterally  Abdomen soft and nontender throughout.  No epigastric tenderness  Extremities: No significant edema        Chest x-ray: I personally reviewed the chest x-ray on the day of service which showed no infiltrate, or bony abnormalities.    Results for orders placed or performed in visit on 07/21/25   XR Chest 2 Views     Status: None    Narrative    EXAM: XR CHEST 2 VIEWS  LOCATION: [facility]  DATE/TIME: 07/21/2025 1:27 PM CDT    INDICATION: Other dysphagia. Pain of left scapula.  COMPARISON: Chest x-ray on 06/18/2021.      Impression    IMPRESSION: PA and lateral views of the chest were obtained. Cardiomediastinal silhouette is within " normal limits. No suspicious focal pulmonary opacities. No significant pleural effusion or pneumothorax.           Signed Electronically by: Jesus Cruz MD

## 2025-07-24 ENCOUNTER — TELEPHONE (OUTPATIENT)
Dept: GASTROENTEROLOGY | Facility: CLINIC | Age: 40
End: 2025-07-24
Payer: COMMERCIAL

## 2025-07-24 NOTE — TELEPHONE ENCOUNTER
Pre visit planning completed.      Procedure details:    Patient scheduled for Upper endoscopy (EGD) on 8/12/25.     Arrival time: 1300. Procedure time 1400    Facility location: [facility]; [address] [place] [address] , [place] [id number]. Check in location: Main entrance at Surgery registation desk.    Sedation type: MAC    Pre op exam needed? No.    Indication for procedure: Pain in left scapula with swallowing for 3.5 weeks, no change with PPI       Chart review:     Electronic implanted devices? No    Recent diagnosis of diverticulitis within the last 6 weeks? No      Medication review:    Diabetic? No    Anticoagulants? No    Weight loss medication/injectable? Yes. Ozempic (Semaglutide). Weekly dosing of medication.  HOLD 7 days before procedure.  Follow up with managing provider.     Other medication HOLDING recommendations:  NA      Prep for procedure:       Procedure information and instructions sent via [employer]         Joslyn Monique RN  Endoscopy Procedure Pre Assessment   [phone] option 3

## 2025-07-24 NOTE — TELEPHONE ENCOUNTER
"Endoscopy Scheduling Screen    Caller: patient    Have you had any respiratory illness or flu-like symptoms in the last 10 days?  No    Patient is ACTIVE on [employer].  Inform patient that all appointment instructions will be sent via [employer].    Review patient's insurance for any non participating payor.    Ordering/Referring Provider:   YANNA WILKS         (If ordering provider performs procedure, schedule with ordering provider unless otherwise instructed. )    BMI: Estimated body mass index is 29.87 kg/m  as calculated from the following:    Height as of 7/21/25: 1.549 m (5' 1\").    Weight as of 7/21/25: 71.7 kg (158 lb 1.3 oz).     Sedation Ordered  moderate sedation.   If patient BMI > 50 do not schedule in [facility].    If patient BMI > 45 do not schedule at [facility].    Are you taking methadone or Suboxone?  NO, No RN review required.    Have you been diagnosed and are being treated for severe PTSD or severe anxiety?  NO, No RN review required.    Are you taking any prescription medications for pain 3 or more times per week?   NO, No RN review required.    Do you have a history of malignant hyperthermia?  No    (Females) Are you currently pregnant?   No     Have you been diagnosed or told you have pulmonary hypertension?   No    Do you have an LVAD?  No    Have you been told you have moderate to severe sleep apnea?  Yes. Do you use a CPAP? Yes Where is the patient located?. (RN Review required for scheduling unless scheduling in Hospital.)     Have you been told you have COPD, asthma, or any other lung disease?  No    Has your doctor ordered any cardiac tests like echo, angiogram, stress test, ablation, or EKG, that you have not completed yet?  No    Do you  have a history of any heart conditions?  No     Have you ever had or are you waiting for an organ transplant?  No. Continue scheduling, no site restrictions.    Have you had a stroke or transient ischemic attack (TIA aka \"mini stroke\") in the last 2 " "years?   No.    Have you been diagnosed with or been told you have cirrhosis of the liver?   No.    Are you currently on dialysis?   No    Do you need assistance transferring?   No    BMI: Estimated body mass index is 29.87 kg/m  as calculated from the following:    Height as of 7/21/25: 1.549 m (5' 1\").    Weight as of 7/21/25: 71.7 kg (158 lb 1.3 oz).     Is patients BMI > 40 and scheduling location [facility]?  No    Do you take an injectable or oral medication for weight loss or diabetes (excluding insulin)?  Yes, hold time can be up to 7 days. Please consult with you prescribing provider to discuss endoscopy recommendations. (Please schedule at least 7 days out.)OZEMPIC    Do you take the medication Naltrexone?  No    Do you take blood thinners?  No       Prep   Are you currently on dialysis or do you have chronic kidney disease?  No    Do you have a diagnosis of diabetes?  No    Do you have a diagnosis of cystic fibrosis (CF)?  No    On a regular basis do you go 3 -5 days between bowel movements?  No    BMI > 40?  No    Preferred Pharmacy:    [facility] [address] [place], [place] - [address] [facility]  [address] [facility] [address]  Phone: [phone] Fax: [phone]    Final Scheduling Details     Procedure scheduled  Upper endoscopy (EGD)    Surgeon:  ABIDA     Date of procedure:  08/12/2025     Pre-OP / PAC:   No - Not required for this site.    Location  [facility]    Sedation   MAC/Deep Sedation - Patient preference.      Patient Reminders:   You will receive a call from a Nurse to review instructions and health history.  This assessment must be completed prior to your procedure.  Failure to complete the Nurse assessment may result in the procedure being cancelled.      On the day of your procedure, please designate an adult(s) who can drive you home stay with you for the next 24 hours. The medicines used in the exam will make you sleepy. You will not be able to drive.      You " cannot take public transportation, ride share services, or non-medical taxi service without a responsible caregiver.  Medical transport services are allowed with the requirement that a responsible caregiver will receive you at your destination.  We require that drivers and caregivers are confirmed prior to your procedure.

## 2025-07-25 NOTE — TELEPHONE ENCOUNTER
Attempted to contact patient in order to complete pre assessment questions.     No answer. Left message to return call to [phone] option 3.    Callback communication sent via [employer].    Emilia Simmons LPN

## 2025-07-28 ENCOUNTER — ANCILLARY PROCEDURE (OUTPATIENT)
Dept: MAMMOGRAPHY | Facility: CLINIC | Age: 40
End: 2025-07-28
Attending: FAMILY MEDICINE
Payer: COMMERCIAL

## 2025-07-28 DIAGNOSIS — R13.10 ODYNOPHAGIA: Primary | ICD-10-CM

## 2025-07-28 DIAGNOSIS — Z80.3 FAMILY HISTORY OF MALIGNANT NEOPLASM OF BREAST: ICD-10-CM

## 2025-07-28 DIAGNOSIS — M89.8X1 PAIN OF LEFT SCAPULA: ICD-10-CM

## 2025-07-28 PROCEDURE — 77067 SCR MAMMO BI INCL CAD: CPT | Mod: 26 | Performed by: RADIOLOGY

## 2025-07-28 PROCEDURE — 77063 BREAST TOMOSYNTHESIS BI: CPT

## 2025-07-28 PROCEDURE — 77063 BREAST TOMOSYNTHESIS BI: CPT | Mod: 26 | Performed by: RADIOLOGY

## 2025-07-28 NOTE — PROGRESS NOTES
Corrected order for CT of chest, abdomen/pelvis -- previously ordered as PE evaluation - no PE evaluation necessary, changed order to CT of chest, abdomen/pelvis.    LORETTA Cruz MD

## 2025-07-28 NOTE — TELEPHONE ENCOUNTER
Pre assessment completed for upcoming procedure.   (Please see previous telephone encounter notes for complete details)    Patient returned call.       Procedure details:    Procedure date 8/12/25, arrival time 1300 and facility location reviewed.    Pre op exam needed? No.    Designated  policy reviewed. Instructed to have someone stay 24 hours post procedure.       Medication review:    Medications reviewed. Please see supporting documentation below. Holding recommendations discussed (if applicable).   Weight loss medication/injectable: Semaglutide-Weight Management (WEGOVY). Weekly dosing of medication.  HOLD 7 days before procedure.  Follow up with managing provider.       Prep for procedure:    Procedure prep instructions reviewed.        Any additional information needed:  N/A      Patient verbalized understanding and had no questions or concerns at this time.      Tika Neal RN  Endoscopy Procedure Pre Assessment   [phone] option 3

## 2025-08-05 ENCOUNTER — HOSPITAL ENCOUNTER (OUTPATIENT)
Dept: CT IMAGING | Facility: CLINIC | Age: 40
Discharge: HOME | End: 2025-08-05
Attending: FAMILY MEDICINE
Payer: COMMERCIAL

## 2025-08-05 DIAGNOSIS — R13.10 ODYNOPHAGIA: ICD-10-CM

## 2025-08-05 DIAGNOSIS — M89.8X1 PAIN OF LEFT SCAPULA: ICD-10-CM

## 2025-08-05 PROCEDURE — 250N000011 HC RX IP 250 OP 636: Performed by: FAMILY MEDICINE

## 2025-08-05 PROCEDURE — 71260 CT THORAX DX C+: CPT | Mod: 26 | Performed by: STUDENT IN AN ORGANIZED HEALTH CARE EDUCATION/TRAINING PROGRAM

## 2025-08-05 PROCEDURE — 74177 CT ABD & PELVIS W/CONTRAST: CPT | Mod: 26 | Performed by: STUDENT IN AN ORGANIZED HEALTH CARE EDUCATION/TRAINING PROGRAM

## 2025-08-05 PROCEDURE — 71260 CT THORAX DX C+: CPT

## 2025-08-05 RX ORDER — IOPAMIDOL 755 MG/ML
95 INJECTION, SOLUTION INTRAVASCULAR ONCE
Status: COMPLETED | OUTPATIENT
Start: 2025-08-05 | End: 2025-08-05

## 2025-08-05 RX ADMIN — IOPAMIDOL 95 ML: 755 INJECTION, SOLUTION INTRAVENOUS at 10:24

## 2025-08-12 ENCOUNTER — ANESTHESIA (OUTPATIENT)
Dept: GASTROENTEROLOGY | Facility: CLINIC | Age: 40
End: 2025-08-12
Payer: COMMERCIAL

## 2025-08-12 ENCOUNTER — HOSPITAL ENCOUNTER (OUTPATIENT)
Facility: CLINIC | Age: 40
Discharge: HOME | End: 2025-08-12
Attending: INTERNAL MEDICINE | Admitting: INTERNAL MEDICINE
Payer: COMMERCIAL

## 2025-08-12 ENCOUNTER — ANESTHESIA EVENT (OUTPATIENT)
Dept: GASTROENTEROLOGY | Facility: CLINIC | Age: 40
End: 2025-08-12
Payer: COMMERCIAL

## 2025-08-12 VITALS
SYSTOLIC BLOOD PRESSURE: 109 MMHG | HEART RATE: 79 BPM | TEMPERATURE: 97.9 F | DIASTOLIC BLOOD PRESSURE: 68 MMHG | OXYGEN SATURATION: 100 %

## 2025-08-12 LAB — UPPER GI ENDOSCOPY: NORMAL

## 2025-08-12 PROCEDURE — 250N000009 HC RX 250: Performed by: NURSE ANESTHETIST, CERTIFIED REGISTERED

## 2025-08-12 PROCEDURE — 250N000011 HC RX IP 250 OP 636: Performed by: NURSE ANESTHETIST, CERTIFIED REGISTERED

## 2025-08-12 PROCEDURE — 88305 TISSUE EXAM BY PATHOLOGIST: CPT | Mod: 26 | Performed by: STUDENT IN AN ORGANIZED HEALTH CARE EDUCATION/TRAINING PROGRAM

## 2025-08-12 PROCEDURE — 370N000017 HC ANESTHESIA TECHNICAL FEE, PER MIN: Performed by: INTERNAL MEDICINE

## 2025-08-12 PROCEDURE — 88305 TISSUE EXAM BY PATHOLOGIST: CPT | Mod: TC | Performed by: INTERNAL MEDICINE

## 2025-08-12 PROCEDURE — 43239 EGD BIOPSY SINGLE/MULTIPLE: CPT | Performed by: INTERNAL MEDICINE

## 2025-08-12 PROCEDURE — 258N000003 HC RX IP 258 OP 636: Performed by: NURSE ANESTHETIST, CERTIFIED REGISTERED

## 2025-08-12 RX ORDER — SODIUM CHLORIDE, SODIUM LACTATE, POTASSIUM CHLORIDE, CALCIUM CHLORIDE 600; 310; 30; 20 MG/100ML; MG/100ML; MG/100ML; MG/100ML
INJECTION, SOLUTION INTRAVENOUS CONTINUOUS PRN
Status: DISCONTINUED | OUTPATIENT
Start: 2025-08-12 | End: 2025-08-12

## 2025-08-12 RX ORDER — PROPOFOL 10 MG/ML
INJECTION, EMULSION INTRAVENOUS PRN
Status: DISCONTINUED | OUTPATIENT
Start: 2025-08-12 | End: 2025-08-12

## 2025-08-12 RX ORDER — LIDOCAINE HYDROCHLORIDE 20 MG/ML
INJECTION, SOLUTION INFILTRATION; PERINEURAL PRN
Status: DISCONTINUED | OUTPATIENT
Start: 2025-08-12 | End: 2025-08-12

## 2025-08-12 RX ADMIN — SODIUM CHLORIDE, SODIUM LACTATE, POTASSIUM CHLORIDE, AND CALCIUM CHLORIDE: .6; .31; .03; .02 INJECTION, SOLUTION INTRAVENOUS at 13:44

## 2025-08-12 RX ADMIN — PROPOFOL 150 MG: 10 INJECTION, EMULSION INTRAVENOUS at 13:49

## 2025-08-12 RX ADMIN — LIDOCAINE HYDROCHLORIDE 50 MG: 20 INJECTION, SOLUTION INFILTRATION; PERINEURAL at 13:49

## 2025-08-12 RX ADMIN — PROPOFOL 100 MG: 10 INJECTION, EMULSION INTRAVENOUS at 13:53

## 2025-08-12 RX ADMIN — PROPOFOL 50 MG: 10 INJECTION, EMULSION INTRAVENOUS at 13:51

## 2025-08-12 RX ADMIN — PROPOFOL 50 MG: 10 INJECTION, EMULSION INTRAVENOUS at 13:55

## 2025-08-12 ASSESSMENT — ACTIVITIES OF DAILY LIVING (ADL)
ADLS_ACUITY_SCORE: 41
ADLS_ACUITY_SCORE: 41

## 2025-08-12 ASSESSMENT — LIFESTYLE VARIABLES: TOBACCO_USE: 1

## 2025-08-14 ENCOUNTER — RESULTS FOLLOW-UP (OUTPATIENT)
Dept: FAMILY MEDICINE | Facility: CLINIC | Age: 40
End: 2025-08-14
Payer: COMMERCIAL

## 2025-08-14 DIAGNOSIS — M94.0 COSTOCHONDRITIS: Primary | ICD-10-CM

## 2025-08-14 LAB
PATH REPORT.COMMENTS IMP SPEC: NORMAL
PATH REPORT.COMMENTS IMP SPEC: NORMAL
PATH REPORT.FINAL DX SPEC: NORMAL
PATH REPORT.GROSS SPEC: NORMAL
PATH REPORT.MICROSCOPIC SPEC OTHER STN: NORMAL
PATH REPORT.RELEVANT HX SPEC: NORMAL
PHOTO IMAGE: NORMAL

## 2025-08-14 RX ORDER — PREDNISONE 20 MG/1
40 TABLET ORAL DAILY
Qty: 10 TABLET | Refills: 0 | Status: SHIPPED | OUTPATIENT
Start: 2025-08-14 | End: 2025-08-19

## 2025-08-18 ENCOUNTER — OFFICE VISIT (OUTPATIENT)
Dept: FAMILY MEDICINE | Facility: CLINIC | Age: 40
End: 2025-08-18
Payer: COMMERCIAL

## 2025-08-18 VITALS
RESPIRATION RATE: 20 BRPM | TEMPERATURE: 98 F | WEIGHT: 156.08 LBS | HEART RATE: 78 BPM | BODY MASS INDEX: 29.47 KG/M2 | HEIGHT: 61 IN | OXYGEN SATURATION: 98 % | SYSTOLIC BLOOD PRESSURE: 114 MMHG | DIASTOLIC BLOOD PRESSURE: 77 MMHG

## 2025-08-18 DIAGNOSIS — M89.8X1 PAIN OF LEFT SCAPULA: Primary | ICD-10-CM

## 2025-08-18 DIAGNOSIS — M94.0 COSTOCHONDRITIS: ICD-10-CM

## 2025-08-18 ASSESSMENT — PATIENT HEALTH QUESTIONNAIRE - PHQ9
SUM OF ALL RESPONSES TO PHQ QUESTIONS 1-9: 10
10. IF YOU CHECKED OFF ANY PROBLEMS, HOW DIFFICULT HAVE THESE PROBLEMS MADE IT FOR YOU TO DO YOUR WORK, TAKE CARE OF THINGS AT HOME, OR GET ALONG WITH OTHER PEOPLE: SOMEWHAT DIFFICULT
SUM OF ALL RESPONSES TO PHQ QUESTIONS 1-9: 10

## (undated) DEVICE — LUBRICATING JELLY 4.25OZ

## (undated) DEVICE — ENDO FORCEP ENDOJAW BIOPSY 2.8MMX230CM FB-220U

## (undated) DEVICE — TUBING SUCTION 12"X1/4" N612

## (undated) DEVICE — KIT ENDO TURNOVER/PROCEDURE CARRY-ON 101822